# Patient Record
Sex: FEMALE | Race: WHITE | URBAN - METROPOLITAN AREA
[De-identification: names, ages, dates, MRNs, and addresses within clinical notes are randomized per-mention and may not be internally consistent; named-entity substitution may affect disease eponyms.]

---

## 2018-11-20 ENCOUNTER — APPOINTMENT (RX ONLY)
Dept: URBAN - METROPOLITAN AREA CLINIC 164 | Facility: CLINIC | Age: 68
Setting detail: DERMATOLOGY
End: 2018-11-20

## 2018-11-20 DIAGNOSIS — D22 MELANOCYTIC NEVI: ICD-10-CM

## 2018-11-20 DIAGNOSIS — D18.0 HEMANGIOMA: ICD-10-CM

## 2018-11-20 DIAGNOSIS — L82.0 INFLAMED SEBORRHEIC KERATOSIS: ICD-10-CM

## 2018-11-20 DIAGNOSIS — L73.8 OTHER SPECIFIED FOLLICULAR DISORDERS: ICD-10-CM

## 2018-11-20 DIAGNOSIS — L82.1 OTHER SEBORRHEIC KERATOSIS: ICD-10-CM

## 2018-11-20 DIAGNOSIS — L81.4 OTHER MELANIN HYPERPIGMENTATION: ICD-10-CM

## 2018-11-20 PROBLEM — L70.0 ACNE VULGARIS: Status: ACTIVE | Noted: 2018-11-20

## 2018-11-20 PROBLEM — L23.7 ALLERGIC CONTACT DERMATITIS DUE TO PLANTS, EXCEPT FOOD: Status: ACTIVE | Noted: 2018-11-20

## 2018-11-20 PROBLEM — J30.1 ALLERGIC RHINITIS DUE TO POLLEN: Status: ACTIVE | Noted: 2018-11-20

## 2018-11-20 PROBLEM — D18.01 HEMANGIOMA OF SKIN AND SUBCUTANEOUS TISSUE: Status: ACTIVE | Noted: 2018-11-20

## 2018-11-20 PROBLEM — L57.0 ACTINIC KERATOSIS: Status: ACTIVE | Noted: 2018-11-20

## 2018-11-20 PROBLEM — D22.5 MELANOCYTIC NEVI OF TRUNK: Status: ACTIVE | Noted: 2018-11-20

## 2018-11-20 PROBLEM — D89.9 DISORDER INVOLVING THE IMMUNE MECHANISM, UNSPECIFIED: Status: ACTIVE | Noted: 2018-11-20

## 2018-11-20 PROCEDURE — ? LIQUID NITROGEN

## 2018-11-20 PROCEDURE — ? COUNSELING

## 2018-11-20 PROCEDURE — 17110 DESTRUCTION B9 LES UP TO 14: CPT

## 2018-11-20 PROCEDURE — 99214 OFFICE O/P EST MOD 30 MIN: CPT | Mod: 25

## 2018-11-20 ASSESSMENT — LOCATION DETAILED DESCRIPTION DERM
LOCATION DETAILED: RIGHT SUPERIOR MEDIAL MIDBACK
LOCATION DETAILED: LEFT MID-UPPER BACK
LOCATION DETAILED: RIGHT SUPERIOR UPPER BACK
LOCATION DETAILED: RIGHT MID-UPPER BACK
LOCATION DETAILED: RIGHT SUPERIOR MEDIAL MALAR CHEEK
LOCATION DETAILED: LEFT SUPERIOR CENTRAL MALAR CHEEK
LOCATION DETAILED: RIGHT INFERIOR CENTRAL MALAR CHEEK
LOCATION DETAILED: LEFT SUPERIOR LATERAL MALAR CHEEK
LOCATION DETAILED: RIGHT CENTRAL MALAR CHEEK
LOCATION DETAILED: RIGHT MEDIAL MALAR CHEEK

## 2018-11-20 ASSESSMENT — LOCATION ZONE DERM
LOCATION ZONE: TRUNK
LOCATION ZONE: FACE

## 2018-11-20 ASSESSMENT — LOCATION SIMPLE DESCRIPTION DERM
LOCATION SIMPLE: RIGHT CHEEK
LOCATION SIMPLE: LEFT UPPER BACK
LOCATION SIMPLE: RIGHT UPPER BACK
LOCATION SIMPLE: RIGHT LOWER BACK
LOCATION SIMPLE: LEFT CHEEK

## 2019-11-04 ENCOUNTER — APPOINTMENT (RX ONLY)
Dept: URBAN - METROPOLITAN AREA CLINIC 164 | Facility: CLINIC | Age: 69
Setting detail: DERMATOLOGY
End: 2019-11-04

## 2019-11-04 DIAGNOSIS — D18.0 HEMANGIOMA: ICD-10-CM

## 2019-11-04 DIAGNOSIS — L57.8 OTHER SKIN CHANGES DUE TO CHRONIC EXPOSURE TO NONIONIZING RADIATION: ICD-10-CM

## 2019-11-04 DIAGNOSIS — L81.4 OTHER MELANIN HYPERPIGMENTATION: ICD-10-CM

## 2019-11-04 DIAGNOSIS — L82.1 OTHER SEBORRHEIC KERATOSIS: ICD-10-CM

## 2019-11-04 DIAGNOSIS — D22 MELANOCYTIC NEVI: ICD-10-CM

## 2019-11-04 PROBLEM — D22.5 MELANOCYTIC NEVI OF TRUNK: Status: ACTIVE | Noted: 2019-11-04

## 2019-11-04 PROBLEM — D18.01 HEMANGIOMA OF SKIN AND SUBCUTANEOUS TISSUE: Status: ACTIVE | Noted: 2019-11-04

## 2019-11-04 PROCEDURE — ? FULL BODY SKIN EXAM

## 2019-11-04 PROCEDURE — ? COUNSELING

## 2019-11-04 PROCEDURE — 99213 OFFICE O/P EST LOW 20 MIN: CPT

## 2019-11-04 PROCEDURE — ? SUNSCREEN RECOMMENDATIONS

## 2019-11-04 ASSESSMENT — LOCATION DETAILED DESCRIPTION DERM
LOCATION DETAILED: SUPERIOR LUMBAR SPINE
LOCATION DETAILED: RIGHT SUPERIOR MEDIAL MIDBACK

## 2019-11-04 ASSESSMENT — LOCATION SIMPLE DESCRIPTION DERM
LOCATION SIMPLE: RIGHT LOWER BACK
LOCATION SIMPLE: LOWER BACK

## 2019-11-04 ASSESSMENT — LOCATION ZONE DERM: LOCATION ZONE: TRUNK

## 2019-11-04 NOTE — PROCEDURE: MIPS QUALITY
Detail Level: Detailed
Quality 111:Pneumonia Vaccination Status For Older Adults: Pneumococcal Vaccination Previously Received
Quality 130: Documentation Of Current Medications In The Medical Record: Current Medications Documented
Quality 226: Preventive Care And Screening: Tobacco Use: Screening And Cessation Intervention: Patient screened for tobacco use and is an ex/non-smoker
Quality 47: Advance Care Plan: Advance Care Planning discussed and documented; advance care plan or surrogate decision maker documented in the medical record.
Quality 110: Preventive Care And Screening: Influenza Immunization: Influenza Immunization Administered during Influenza season
Quality 402: Tobacco Use And Help With Quitting Among Adolescents: Patient screened for tobacco and never smoked
Quality 474: Zoster Vaccination Status: Shingrix Vaccination not Administered or Previously Received, Reason not Otherwise Specified
Quality 431: Preventive Care And Screening: Unhealthy Alcohol Use - Screening: Patient screened for unhealthy alcohol use using a single question and scores less than 2 times per year

## 2020-08-03 LAB
HCT VFR BLD CALC: 18.8 % (ref 36–46)
HEMOGLOBIN: 6.4 G/DL (ref 12–16)

## 2020-08-04 LAB
APPEARANCE: ABNORMAL
BACTERIA, URINE: ABNORMAL /HPF
BILIRUBIN, URINE: NEGATIVE
BLOOD, URINE: NEGATIVE
COLOR, URINE: YELLOW
GLUCOSE, URINE: NEGATIVE MG/DL
KETONES, URINE: NEGATIVE MG/DL
LEUKOCYTE ESTERASE, URINE: ABNORMAL
NITRITE, URINE: NEGATIVE
PH UA: 5 (ref 5–8)
PROTEIN UA: NEGATIVE MG/DL
RBC URINE: 27 /HPF (ref 0–5)
SPECIFIC GRAVITY, URINE: 1.01 (ref 1–1)
SPECIMEN SOURCE: ABNORMAL
SPECIMEN SOURCE: ABNORMAL
SURGICAL PATHOLOGY REPORT: NORMAL
UROBILINOGEN, URINE: <2 MG/DL (ref 0–1.9)
WBC URINE: >182 /HPF (ref 0–5)

## 2020-08-17 ENCOUNTER — OFFICE VISIT (OUTPATIENT)
Dept: GERIATRIC MEDICINE | Age: 70
End: 2020-08-17
Payer: MEDICARE

## 2020-08-17 VITALS
RESPIRATION RATE: 20 BRPM | DIASTOLIC BLOOD PRESSURE: 78 MMHG | TEMPERATURE: 98.3 F | SYSTOLIC BLOOD PRESSURE: 136 MMHG | HEART RATE: 74 BPM | OXYGEN SATURATION: 96 %

## 2020-08-17 PROBLEM — K26.4 GASTROINTESTINAL HEMORRHAGE ASSOCIATED WITH DUODENAL ULCER: Status: ACTIVE | Noted: 2020-08-17

## 2020-08-17 PROBLEM — N18.30 CKD (CHRONIC KIDNEY DISEASE) STAGE 3, GFR 30-59 ML/MIN (HCC): Status: ACTIVE | Noted: 2020-08-17

## 2020-08-17 PROBLEM — I10 ESSENTIAL HYPERTENSION: Status: ACTIVE | Noted: 2020-08-17

## 2020-08-17 PROBLEM — J44.9 CHRONIC OBSTRUCTIVE PULMONARY DISEASE (HCC): Status: ACTIVE | Noted: 2020-08-17

## 2020-08-17 PROBLEM — D62 ANEMIA DUE TO ACUTE BLOOD LOSS: Status: ACTIVE | Noted: 2020-08-17

## 2020-08-17 PROCEDURE — 99305 1ST NF CARE MODERATE MDM 35: CPT | Performed by: FAMILY MEDICINE

## 2020-08-17 PROCEDURE — 1123F ACP DISCUSS/DSCN MKR DOCD: CPT | Performed by: FAMILY MEDICINE

## 2020-08-17 RX ORDER — DIPHENHYDRAMINE HYDROCHLORIDE 25 MG/1
25 CAPSULE ORAL EVERY 8 HOURS PRN
COMMUNITY
Start: 2020-06-25 | End: 2020-09-16

## 2020-08-17 RX ORDER — METOPROLOL TARTRATE 50 MG/1
50 TABLET, FILM COATED ORAL 2 TIMES DAILY
COMMUNITY
End: 2020-09-16 | Stop reason: SDUPTHER

## 2020-08-17 RX ORDER — OMEPRAZOLE 40 MG/1
40 CAPSULE, DELAYED RELEASE ORAL DAILY
COMMUNITY
End: 2020-09-16 | Stop reason: SDUPTHER

## 2020-08-17 RX ORDER — ALBUTEROL SULFATE 0.63 MG/3ML
1 SOLUTION RESPIRATORY (INHALATION) EVERY 4 HOURS
COMMUNITY
Start: 2020-07-24 | End: 2020-09-16

## 2020-08-17 RX ORDER — AMLODIPINE BESYLATE 5 MG/1
10 TABLET ORAL EVERY MORNING
COMMUNITY
Start: 2020-06-25 | End: 2020-09-16

## 2020-08-17 ASSESSMENT — ENCOUNTER SYMPTOMS
WHEEZING: 0
CONSTIPATION: 0
DIARRHEA: 0
COUGH: 0
SHORTNESS OF BREATH: 0
RHINORRHEA: 0
TROUBLE SWALLOWING: 1
SORE THROAT: 1
ABDOMINAL PAIN: 0

## 2020-08-17 NOTE — PROGRESS NOTES
[facility]   [address]  [address]  P: [phone]   O: [phone]        Chief Complaint  Chief Complaint   Patient presents with    GI Bleeding       HPI:  65 y. o.female w hx of HTN, COPD, CKD3, pulm nodule. She initially presented to the [address] [place] [address] ED with destats and SOB. This was attributed to new systolic HF and COPD exacerbation. She developed dark stools and was found to have a Hb 4.1. An EGD revealed a bleeding duodenal ulcer followed by clipping. The EGD and clipping was repeated unsuccessfully 3 times as she was transfused a total of 11u RBCs. She was transferred to [address] [place] [address] where she had IR embolization. The CKD worsened and she was started on dialysis. She was treated for pneumonia. She was discharged to SNF for continued care, dialysis, and plans to f/u with GI as outpatient. We met at dinner time in her room. She is feeling much better and denies SOB, abd pain, n/v, or blood in the stool. Her throat is quite sore from the procedures so she prefers to only have fluids which she is tolerating well. History reviewed. No pertinent past medical history. History reviewed. No pertinent surgical history. Social History     Socioeconomic History    Marital status:       Spouse name: Not on file    Number of children: Not on file    Years of education: Not on file    Highest education level: Not on file   Occupational History    Not on file   Social Needs    Financial resource strain: Not on file    Food insecurity     Worry: Not on file     Inability: Not on file    Transportation needs     Medical: Not on file     Non-medical: Not on file   Tobacco Use    Smoking status: Not on file   Substance and Sexual Activity    Alcohol use: Not on file    Drug use: Not on file    Sexual activity: Not on file   Lifestyle    Physical activity     Days per week: Not on file     Minutes per session: Not on file    Stress: Not on file   Relationships    Social connections     Talks on phone: Not on file     Gets together: Not on file     Attends Anglican service: Not on file     Active member of club or organization: Not on file     Attends meetings of clubs or organizations: Not on file     Relationship status: Not on file    Intimate partner violence     Fear of current or ex partner: Not on file     Emotionally abused: Not on file     Physically abused: Not on file     Forced sexual activity: Not on file   Other Topics Concern    Not on file   Social History Narrative    Not on file     Allergies   Allergen Reactions    Ace Inhibitors     Benazepril     Ciprofloxacin     Shellfish Allergy     Sulfa Antibiotics      Current Outpatient Medications   Medication Sig Dispense Refill    albuterol (ACCUNEB) 0.63 MG/3ML nebulizer solution Inhale 1 Units into the lungs every 4 hours      amLODIPine (NORVASC) 5 MG tablet Take 10 mg by mouth every morning      BANOPHEN 25 MG capsule Take 25 mg by mouth every 8 hours as needed      metoprolol tartrate (LOPRESSOR) 50 MG tablet Take 50 mg by mouth 2 times daily      omeprazole (PRILOSEC) 40 MG delayed release capsule Take 40 mg by mouth daily       No current facility-administered medications for this visit. ROS:  Review of Systems   Constitutional: Negative for chills and fever. HENT: Positive for sore throat and trouble swallowing. Negative for rhinorrhea. Respiratory: Negative for cough, shortness of breath and wheezing. Gastrointestinal: Negative for abdominal pain, constipation and diarrhea. Endocrine: Negative for polydipsia and polyuria. Genitourinary: Negative for dysuria, frequency and urgency. Neurological: Negative for syncope, light-headedness, numbness and headaches. Psychiatric/Behavioral: Negative for sleep disturbance. The patient is not nervous/anxious.         Vitals:    08/17/20 1129   BP: 136/78   Pulse: 74   Resp: 20   Temp: 98.3 °F (36.8 °C)   SpO2: 96%       Physical exam:  Physical Exam  Vitals signs reviewed. Constitutional:       General: She is not in acute distress. Appearance: She is well-developed. HENT:      Head: Normocephalic and atraumatic. Mouth/Throat:      Pharynx: No oropharyngeal exudate. Neck:      Musculoskeletal: Normal range of motion. Thyroid: No thyromegaly. Cardiovascular:      Rate and Rhythm: Normal rate and regular rhythm. Heart sounds: Normal heart sounds. No murmur. Pulmonary:      Effort: Pulmonary effort is normal. No respiratory distress. Breath sounds: Normal breath sounds. No wheezing. Abdominal:      General: There is no distension. Palpations: Abdomen is soft. Tenderness: There is no abdominal tenderness. There is no guarding or rebound. Lymphadenopathy:      Cervical: No cervical adenopathy. Skin:     General: Skin is warm and dry. Neurological:      Mental Status: She is alert and oriented to person, place, and time. Psychiatric:         Behavior: Behavior normal.         Assessment/Plan:  65 y.o. female here mainly for GI bleed:  - encouraged PO as tolerated; she is aware to let someone know if develops blood in the stool; She'll f/u GI as outpatient  - CKD: continuing the HD per renal recs     Diagnosis Orders   1. Gastrointestinal hemorrhage associated with duodenal ulcer     2. Anemia due to acute blood loss     3. Chronic obstructive pulmonary disease, unspecified COPD type ([facility] [address]     4. CKD (chronic kidney disease) stage 3, GFR 30-59 ml/min ([facility])     5.  Essential hypertension              Dyan Carson MD

## 2020-08-31 ENCOUNTER — OFFICE VISIT (OUTPATIENT)
Dept: GERIATRIC MEDICINE | Age: 70
End: 2020-08-31
Payer: MEDICARE

## 2020-08-31 PROCEDURE — 99308 SBSQ NF CARE LOW MDM 20: CPT | Performed by: PHYSICIAN ASSISTANT

## 2020-08-31 PROCEDURE — 1111F DSCHRG MED/CURRENT MED MERGE: CPT | Performed by: PHYSICIAN ASSISTANT

## 2020-09-10 NOTE — PROGRESS NOTES
PATIENT: Crystal Reddy : 1950 DOS: 2020     [facility]    HISTORY OF PRESENT ILLNESS:  Vital signs reviewed on site today, stable, within normal limits. Patient being seen today after she had been admitted to the hospital for shock and bleeding ulcers. She states did have respiratory distress and was intubated, she states. Had multiple esophageal scopes with interventions as well. We will evaluate patient's chart, the [facility], to confirm. Patient states that she felt like she was going to die at that time; however, she feels much better. She feels \"incredible in comparison. \"  She is ready to discharge. She has been refusing aerosols as well as protein supplementation. Will DC both of these medications, as she has not used or has been in need out. She is eating adequately well day-to-day. She is on a renal diet that she states it is hard to tolerate, but she does her best to get good nutrition in. She lives at home and near her family. Her son is 5 minutes away from her home as well as other family including her granddaughter. The patient is making gains in physical therapy, increasing strength and ability to walk and ambulate via walker. Will begin to prepare this patient for discharge, planning to go home. Will do a CBC and a CMP to evaluate prior to discharge. We will revisit patient in 1 week, when patient will likely have discharge prepared. Patient denies any new bleeding. No hemoptysis, hematuria, BRBPR. Denies any dark stool. Does not report any dizziness or focal neurologic deficit. Cranial nerves II through XII intact. Heart rate and rhythm normal.  States no headaches. No GI or  upset at this time. She is continent. A and O x3 today. Very pleasant, cooperative overall. Will follow up and begin discharge planning as discussed prior. MEDICATIONS:  Reviewed. ALLERGIES:  Reviewed. REVIEW OF SYSTEMS:  See HPI.     PHYSICAL EXAMINATION:  GENERAL:   Bright, alert, good hygiene, oriented x3. CARDIOPULMONARY:  Regular rate, rhythm. No JVD noted. No significant lower leg edema. Pedal pulses intact, +1 bilaterally. PULMONARY:  Lung sounds clear to auscultation. ABDOMINAL:  Normal bowel sounds. Soft, nontender in all 4 quadrants. Some mild distention due to obesity. PSYCHIATRIC:  The patient's mood is overall stable. Denies any stress, anxiety. Some minor dysthymia due to wanted to be home and with family. ASSESSMENT AND PLAN:  1. History of gastric ulcers - no new bleeding. Continue current medications. Follow up with PCP upon discharge. 2.   COPD / History of respiratory distress  -patient has currently no respiratory issues at the moment. DC aerosols, due to unused. 3.   Protein malnutrition  - DC protein supplements due to unused. Patient is getting adequate diet p.o.         Electronically Signed By: Mitch Sidhu PA-C on 08/31/2020 20:36:07  ______________________________  MOON Cope/[id number]  D: 08/31/2020 15:46:03  T: 08/31/2020 16:49:18    cc: Ridge Garcia

## 2020-09-16 ENCOUNTER — OFFICE VISIT (OUTPATIENT)
Dept: FAMILY MEDICINE CLINIC | Age: 70
End: 2020-09-16
Payer: MEDICARE

## 2020-09-16 VITALS
BODY MASS INDEX: 26.4 KG/M2 | HEART RATE: 63 BPM | DIASTOLIC BLOOD PRESSURE: 78 MMHG | HEIGHT: 63 IN | SYSTOLIC BLOOD PRESSURE: 110 MMHG | OXYGEN SATURATION: 98 % | WEIGHT: 149 LBS | TEMPERATURE: 97.5 F

## 2020-09-16 PROCEDURE — 1090F PRES/ABSN URINE INCON ASSESS: CPT | Performed by: FAMILY MEDICINE

## 2020-09-16 PROCEDURE — G8427 DOCREV CUR MEDS BY ELIG CLIN: HCPCS | Performed by: FAMILY MEDICINE

## 2020-09-16 PROCEDURE — G9901 SNP/LG TRM CRE PT W/POS CDE: HCPCS | Performed by: FAMILY MEDICINE

## 2020-09-16 PROCEDURE — G9898 SNP/LG TRM CRE PT W/POS CDE: HCPCS | Performed by: FAMILY MEDICINE

## 2020-09-16 PROCEDURE — 1123F ACP DISCUSS/DSCN MKR DOCD: CPT | Performed by: FAMILY MEDICINE

## 2020-09-16 PROCEDURE — G8417 CALC BMI ABV UP PARAM F/U: HCPCS | Performed by: FAMILY MEDICINE

## 2020-09-16 PROCEDURE — 99204 OFFICE O/P NEW MOD 45 MIN: CPT | Performed by: FAMILY MEDICINE

## 2020-09-16 PROCEDURE — G8400 PT W/DXA NO RESULTS DOC: HCPCS | Performed by: FAMILY MEDICINE

## 2020-09-16 PROCEDURE — 4040F PNEUMOC VAC/ADMIN/RCVD: CPT | Performed by: FAMILY MEDICINE

## 2020-09-16 PROCEDURE — 1036F TOBACCO NON-USER: CPT | Performed by: FAMILY MEDICINE

## 2020-09-16 RX ORDER — TORSEMIDE 100 MG/1
100 TABLET ORAL DAILY
Qty: 90 TABLET | Refills: 1 | Status: SHIPPED | OUTPATIENT
Start: 2020-09-16 | End: 2021-01-11 | Stop reason: ALTCHOICE

## 2020-09-16 RX ORDER — OMEPRAZOLE 40 MG/1
20 CAPSULE, DELAYED RELEASE ORAL 2 TIMES DAILY
Qty: 60 CAPSULE | Refills: 5 | Status: SHIPPED | OUTPATIENT
Start: 2020-09-16 | End: 2021-01-11 | Stop reason: ALTCHOICE

## 2020-09-16 RX ORDER — CHOLECALCIFEROL (VITAMIN D3) 10 MCG
1 TABLET ORAL DAILY
COMMUNITY
End: 2020-09-16 | Stop reason: SDUPTHER

## 2020-09-16 RX ORDER — CHOLECALCIFEROL (VITAMIN D3) 10 MCG
1 TABLET ORAL DAILY
Qty: 90 CAPSULE | Refills: 1 | Status: SHIPPED | OUTPATIENT
Start: 2020-09-16 | End: 2021-01-11 | Stop reason: ALTCHOICE

## 2020-09-16 RX ORDER — TORSEMIDE 100 MG/1
100 TABLET ORAL DAILY
Qty: 30 TABLET | Refills: 5 | Status: SHIPPED | OUTPATIENT
Start: 2020-09-16 | End: 2021-01-11 | Stop reason: ALTCHOICE

## 2020-09-16 RX ORDER — CHOLECALCIFEROL (VITAMIN D3) 10 MCG
1 TABLET ORAL DAILY
Qty: 30 CAPSULE | Refills: 5 | Status: SHIPPED | OUTPATIENT
Start: 2020-09-16 | End: 2021-01-11 | Stop reason: ALTCHOICE

## 2020-09-16 RX ORDER — OMEPRAZOLE 40 MG/1
40 CAPSULE, DELAYED RELEASE ORAL DAILY
Qty: 90 CAPSULE | Refills: 1 | Status: SHIPPED | OUTPATIENT
Start: 2020-09-16 | End: 2021-01-11 | Stop reason: ALTCHOICE

## 2020-09-16 RX ORDER — METOPROLOL TARTRATE 50 MG/1
50 TABLET, FILM COATED ORAL 2 TIMES DAILY
Qty: 180 TABLET | Refills: 1 | Status: SHIPPED | OUTPATIENT
Start: 2020-09-16 | End: 2021-01-11 | Stop reason: ALTCHOICE

## 2020-09-16 RX ORDER — OMEPRAZOLE 40 MG/1
40 CAPSULE, DELAYED RELEASE ORAL
Qty: 90 CAPSULE | Refills: 3 | Status: SHIPPED | OUTPATIENT
Start: 2020-09-16 | End: 2021-01-11 | Stop reason: ALTCHOICE

## 2020-09-16 RX ORDER — METOPROLOL TARTRATE 50 MG/1
50 TABLET, FILM COATED ORAL 2 TIMES DAILY
Qty: 60 TABLET | Refills: 5 | Status: SHIPPED | OUTPATIENT
Start: 2020-09-16 | End: 2021-01-11 | Stop reason: ALTCHOICE

## 2020-09-16 RX ORDER — TORSEMIDE 100 MG/1
100 TABLET ORAL DAILY
COMMUNITY
End: 2020-09-16 | Stop reason: SDUPTHER

## 2020-09-16 SDOH — HEALTH STABILITY: MENTAL HEALTH: HOW OFTEN DO YOU HAVE A DRINK CONTAINING ALCOHOL?: NEVER

## 2020-09-16 ASSESSMENT — PATIENT HEALTH QUESTIONNAIRE - PHQ9
SUM OF ALL RESPONSES TO PHQ QUESTIONS 1-9: 0
2. FEELING DOWN, DEPRESSED OR HOPELESS: 0
SUM OF ALL RESPONSES TO PHQ9 QUESTIONS 1 & 2: 0
1. LITTLE INTEREST OR PLEASURE IN DOING THINGS: 0
SUM OF ALL RESPONSES TO PHQ QUESTIONS 1-9: 0

## 2020-09-16 ASSESSMENT — ENCOUNTER SYMPTOMS
RHINORRHEA: 0
CONSTIPATION: 0
DIARRHEA: 0
COUGH: 0
ABDOMINAL PAIN: 0
WHEEZING: 0
SORE THROAT: 0
SHORTNESS OF BREATH: 0

## 2020-09-16 NOTE — PROGRESS NOTES
[address] [facility] [address] [place] [address] PRIMARY CARE  [id number]  Dept: [phone]  Dept Fax: : [phone]   Chief Complaint  Chief Complaint   Patient presents with   [facility]     follow up from [facility]       HPI:  65 y. o.female who presents for establish:    Lost her sense of taste for the past few months; has hunger but nothing tastes good. Still has has sense of smell and sense of tasting sweet. She is now home. Uses walker but getting PT. Lives alone. Her son visits 5min away. Sees renal for dialysis. Recent hospitalization and SNF admission:  65 y. o.female w hx of HTN, COPD, CKD3, pulm nodule. She initially presented to the [facility] ED with destats and SOB. This was attributed to new systolic HF and COPD exacerbation. She developed dark stools and was found to have a Hb 4.1. An EGD revealed a bleeding duodenal ulcer followed by clipping. The EGD and clipping was repeated unsuccessfully 3 times as she was transfused a total of 11u RBCs. She was transferred to [address] [place] [address] where she had IR embolization. The CKD worsened and she was started on dialysis. She was treated for pneumonia. She was discharged to SNF for continued care, dialysis, and plans to f/u with GI as outpatient. History reviewed. No pertinent past medical history. History reviewed. No pertinent surgical history. Social History     Socioeconomic History    Marital status:       Spouse name: Not on file    Number of children: Not on file    Years of education: Not on file    Highest education level: Not on file   Occupational History    Not on file   Social Needs    Financial resource strain: Not on file    Food insecurity     Worry: Not on file     Inability: Not on file    Transportation needs     Medical: Not on file     Non-medical: Not on file   Tobacco Use    Smoking status: Never Smoker    Smokeless tobacco: Never Used   Substance and Sexual Activity    Alcohol use: Never     Frequency: Never    Drug use: Never    Sexual activity: Not on file   Lifestyle    Physical activity     Days per week: Not on file     Minutes per session: Not on file    Stress: Not on file   Relationships    Social connections     Talks on phone: Not on file     Gets together: Not on file     Attends Holiness service: Not on file     Active member of club or organization: Not on file     Attends meetings of clubs or organizations: Not on file     Relationship status: Not on file    Intimate partner violence     Fear of current or ex partner: Not on file     Emotionally abused: Not on file     Physically abused: Not on file     Forced sexual activity: Not on file   Other Topics Concern    Not on file   Social History Narrative    Not on file     Allergies   Allergen Reactions    Ace Inhibitors     Benazepril     Ciprofloxacin     Shellfish Allergy     Sulfa Antibiotics      Current Outpatient Medications   Medication Sig Dispense Refill    omeprazole (PRILOSEC) 40 MG delayed release capsule Take 1 capsule by mouth 2 times daily 60 capsule 5    metoprolol tartrate (LOPRESSOR) 50 MG tablet Take 1 tablet by mouth 2 times daily 60 tablet 5    torsemide (DEMADEX) 100 MG tablet Take 1 tablet by mouth daily 30 tablet 5    b complex-C-folic acid (NEPHROCAPS) 1 MG capsule Take 1 capsule by mouth daily 30 capsule 5    omeprazole (PRILOSEC) 40 MG delayed release capsule Take 1 capsule by mouth every morning (before breakfast) 90 capsule 3    metoprolol tartrate (LOPRESSOR) 50 MG tablet Take 1 tablet by mouth 2 times daily 180 tablet 1    torsemide (DEMADEX) 100 MG tablet Take 1 tablet by mouth daily 90 tablet 1     No current facility-administered medications for this visit. ROS:  Review of Systems   Constitutional: Negative for chills and fever. HENT: Negative for rhinorrhea and sore throat.     Respiratory: Negative for cough, shortness of breath and wheezing. Gastrointestinal: Negative for abdominal pain, constipation and diarrhea. Endocrine: Negative for polydipsia and polyuria. Genitourinary: Negative for dysuria, frequency and urgency. Musculoskeletal: Positive for gait problem. Neurological: Negative for syncope, light-headedness, numbness and headaches. Psychiatric/Behavioral: Negative for sleep disturbance. The patient is not nervous/anxious. Vitals:    09/16/20 1359   BP: 110/78   Site: Right Upper Arm   Position: Sitting   Cuff Size: Medium Adult   Pulse: 63   Temp: 97.5 °F (36.4 °C)   TempSrc: Infrared   SpO2: 98%   Weight: 149 lb (67.6 kg)   Height: 5' 3\" (1.6 m)       Physical exam:  Physical Exam  Vitals signs reviewed. Constitutional:       General: She is not in acute distress. Appearance: She is well-developed. HENT:      Head: Normocephalic and atraumatic. Mouth/Throat:      Pharynx: No oropharyngeal exudate. Neck:      Musculoskeletal: Normal range of motion. Thyroid: No thyromegaly. Cardiovascular:      Rate and Rhythm: Normal rate and regular rhythm. Heart sounds: Normal heart sounds. No murmur. Pulmonary:      Effort: Pulmonary effort is normal. No respiratory distress. Breath sounds: Normal breath sounds. No wheezing. Abdominal:      General: There is no distension. Palpations: Abdomen is soft. Tenderness: There is no abdominal tenderness. There is no guarding or rebound. Lymphadenopathy:      Cervical: No cervical adenopathy. Skin:     General: Skin is warm and dry. Neurological:      Mental Status: She is alert and oriented to person, place, and time. Psychiatric:         Behavior: Behavior normal.         Assessment/Plan:  65 y.o. female here mainly for establish and multiple issues:  - meds refilled; Renal will handle her diuretics; sending to neuro regarding the loss of taste     Diagnosis Orders   1.  CKD (chronic kidney disease) stage 3, GFR 30-59 ml/min ([facility])  torsemide (DEMADEX) 100 MG tablet    torsemide (DEMADEX) 100 MG tablet   2. Essential hypertension  metoprolol tartrate (LOPRESSOR) 50 MG tablet    metoprolol tartrate (LOPRESSOR) 50 MG tablet   3. Encounter for medical examination to establish care     4. Gastroesophageal reflux disease, esophagitis presence not specified  omeprazole (PRILOSEC) 40 MG delayed release capsule    b complex-C-folic acid (NEPHROCAPS) 1 MG capsule    omeprazole (PRILOSEC) 40 MG delayed release capsule   5. Loss of perception for taste  Ambulatory referral to Neurology        Return if symptoms worsen or fail to improve.     Faith Fu MD

## 2020-09-16 NOTE — TELEPHONE ENCOUNTER
Rx requested:  Requested Prescriptions     Pending Prescriptions Disp Refills    b complex-C-folic acid (NEPHROCAPS) 1 MG capsule 90 capsule 1     Sig: Take 1 capsule by mouth daily    metoprolol tartrate (LOPRESSOR) 50 MG tablet 180 tablet 1     Sig: Take 1 tablet by mouth 2 times daily    omeprazole (PRILOSEC) 40 MG delayed release capsule 90 capsule 1     Sig: Take 1 capsule by mouth daily    torsemide (DEMADEX) 100 MG tablet 90 tablet 1     Sig: Take 1 tablet by mouth daily       Last Office Visit:   Seen today by Dr Italo Mayberry- maisha 30 days at local and 90 days at mail a way  Last filled:  9/16/2020    Next Visit Date:  No future appointments.

## 2020-12-14 ENCOUNTER — APPOINTMENT (RX ONLY)
Dept: URBAN - METROPOLITAN AREA CLINIC 164 | Facility: CLINIC | Age: 70
Setting detail: DERMATOLOGY
End: 2020-12-14

## 2020-12-14 DIAGNOSIS — L81.4 OTHER MELANIN HYPERPIGMENTATION: ICD-10-CM

## 2020-12-14 DIAGNOSIS — D18.0 HEMANGIOMA: ICD-10-CM

## 2020-12-14 DIAGNOSIS — L82.1 OTHER SEBORRHEIC KERATOSIS: ICD-10-CM

## 2020-12-14 DIAGNOSIS — D22 MELANOCYTIC NEVI: ICD-10-CM

## 2020-12-14 DIAGNOSIS — L57.8 OTHER SKIN CHANGES DUE TO CHRONIC EXPOSURE TO NONIONIZING RADIATION: ICD-10-CM

## 2020-12-14 PROBLEM — D22.5 MELANOCYTIC NEVI OF TRUNK: Status: ACTIVE | Noted: 2020-12-14

## 2020-12-14 PROBLEM — D18.01 HEMANGIOMA OF SKIN AND SUBCUTANEOUS TISSUE: Status: ACTIVE | Noted: 2020-12-14

## 2020-12-14 PROCEDURE — ? FULL BODY SKIN EXAM

## 2020-12-14 PROCEDURE — ? COUNSELING

## 2020-12-14 PROCEDURE — 99214 OFFICE O/P EST MOD 30 MIN: CPT

## 2020-12-14 PROCEDURE — ? SUNSCREEN RECOMMENDATIONS

## 2020-12-14 ASSESSMENT — LOCATION DETAILED DESCRIPTION DERM
LOCATION DETAILED: SUPERIOR LUMBAR SPINE
LOCATION DETAILED: RIGHT SUPERIOR MEDIAL MIDBACK

## 2020-12-14 ASSESSMENT — LOCATION ZONE DERM: LOCATION ZONE: TRUNK

## 2020-12-14 ASSESSMENT — LOCATION SIMPLE DESCRIPTION DERM
LOCATION SIMPLE: RIGHT LOWER BACK
LOCATION SIMPLE: LOWER BACK

## 2020-12-14 NOTE — PROCEDURE: SUNSCREEN RECOMMENDATIONS
Reason for Call:  Other prescription    Detailed comments: mom calling, asking for a refill on Sam's FLUoxetine (PROZAC) 20 MG/5ML solution sent to [facility]     Phone Number Patient can be reached at: Home number on file [phone] (home)    Best Time: any     Can we leave a detailed message on this number? YES    Call taken on 5/25/2018 at 10:28 AM by Billie Anaya        
General Sunscreen Counseling: I recommended a broad spectrum sunscreen with a SPF of 30 or higher.  I explained that SPF 30 sunscreens block approximately 97 percent of the sun's harmful rays.  Sunscreens should be applied at least 15 minutes prior to expected sun exposure and then every 2 hours after that as long as sun exposure continues. If swimming or exercising sunscreen should be reapplied every 45 minutes to an hour after getting wet or sweating.  One ounce, or the equivalent of a shot glass full of sunscreen, is adequate to protect the skin not covered by a bathing suit. I also recommended a lip balm with a sunscreen as well. Sun protective clothing can be used in lieu of sunscreen but must be worn the entire time you are exposed to the sun's rays.
Detail Level: Generalized

## 2021-01-11 ENCOUNTER — OFFICE VISIT (OUTPATIENT)
Dept: FAMILY MEDICINE CLINIC | Age: 71
End: 2021-01-11
Payer: MEDICARE

## 2021-01-11 VITALS
DIASTOLIC BLOOD PRESSURE: 64 MMHG | HEART RATE: 81 BPM | SYSTOLIC BLOOD PRESSURE: 128 MMHG | RESPIRATION RATE: 14 BRPM | HEIGHT: 61 IN | TEMPERATURE: 97.9 F | OXYGEN SATURATION: 98 % | WEIGHT: 135 LBS | BODY MASS INDEX: 25.49 KG/M2

## 2021-01-11 DIAGNOSIS — Z12.11 SCREENING FOR COLON CANCER: ICD-10-CM

## 2021-01-11 DIAGNOSIS — D62 ANEMIA DUE TO ACUTE BLOOD LOSS: ICD-10-CM

## 2021-01-11 DIAGNOSIS — N18.30 STAGE 3 CHRONIC KIDNEY DISEASE, UNSPECIFIED WHETHER STAGE 3A OR 3B CKD (HCC): ICD-10-CM

## 2021-01-11 DIAGNOSIS — J44.9 CHRONIC OBSTRUCTIVE PULMONARY DISEASE, UNSPECIFIED COPD TYPE (HCC): ICD-10-CM

## 2021-01-11 DIAGNOSIS — K26.4 GASTROINTESTINAL HEMORRHAGE ASSOCIATED WITH DUODENAL ULCER: ICD-10-CM

## 2021-01-11 DIAGNOSIS — I10 ESSENTIAL HYPERTENSION: ICD-10-CM

## 2021-01-11 DIAGNOSIS — Z12.31 ENCOUNTER FOR SCREENING MAMMOGRAM FOR BREAST CANCER: Primary | ICD-10-CM

## 2021-01-11 PROCEDURE — 3017F COLORECTAL CA SCREEN DOC REV: CPT | Performed by: FAMILY MEDICINE

## 2021-01-11 PROCEDURE — 99214 OFFICE O/P EST MOD 30 MIN: CPT | Performed by: FAMILY MEDICINE

## 2021-01-11 PROCEDURE — 1090F PRES/ABSN URINE INCON ASSESS: CPT | Performed by: FAMILY MEDICINE

## 2021-01-11 PROCEDURE — 3023F SPIROM DOC REV: CPT | Performed by: FAMILY MEDICINE

## 2021-01-11 PROCEDURE — G8926 SPIRO NO PERF OR DOC: HCPCS | Performed by: FAMILY MEDICINE

## 2021-01-11 PROCEDURE — G8484 FLU IMMUNIZE NO ADMIN: HCPCS | Performed by: FAMILY MEDICINE

## 2021-01-11 PROCEDURE — 4040F PNEUMOC VAC/ADMIN/RCVD: CPT | Performed by: FAMILY MEDICINE

## 2021-01-11 PROCEDURE — 1123F ACP DISCUSS/DSCN MKR DOCD: CPT | Performed by: FAMILY MEDICINE

## 2021-01-11 PROCEDURE — G8427 DOCREV CUR MEDS BY ELIG CLIN: HCPCS | Performed by: FAMILY MEDICINE

## 2021-01-11 PROCEDURE — 1036F TOBACCO NON-USER: CPT | Performed by: FAMILY MEDICINE

## 2021-01-11 PROCEDURE — G8400 PT W/DXA NO RESULTS DOC: HCPCS | Performed by: FAMILY MEDICINE

## 2021-01-11 PROCEDURE — G8417 CALC BMI ABV UP PARAM F/U: HCPCS | Performed by: FAMILY MEDICINE

## 2021-01-11 RX ORDER — B,C/FOLIC/ZINC/SELENOMETH/D3/E 0.8-12.5MG
TABLET ORAL
COMMUNITY
Start: 2020-12-21

## 2021-01-11 RX ORDER — OMEPRAZOLE 20 MG/1
20 CAPSULE, DELAYED RELEASE ORAL DAILY
COMMUNITY
End: 2021-01-15 | Stop reason: SDUPTHER

## 2021-01-11 SDOH — ECONOMIC STABILITY: INCOME INSECURITY: HOW HARD IS IT FOR YOU TO PAY FOR THE VERY BASICS LIKE FOOD, HOUSING, MEDICAL CARE, AND HEATING?: NOT HARD AT ALL

## 2021-01-11 SDOH — ECONOMIC STABILITY: FOOD INSECURITY: WITHIN THE PAST 12 MONTHS, THE FOOD YOU BOUGHT JUST DIDN'T LAST AND YOU DIDN'T HAVE MONEY TO GET MORE.: NEVER TRUE

## 2021-01-11 SDOH — ECONOMIC STABILITY: TRANSPORTATION INSECURITY
IN THE PAST 12 MONTHS, HAS THE LACK OF TRANSPORTATION KEPT YOU FROM MEDICAL APPOINTMENTS OR FROM GETTING MEDICATIONS?: NO

## 2021-01-11 SDOH — ECONOMIC STABILITY: FOOD INSECURITY: WITHIN THE PAST 12 MONTHS, YOU WORRIED THAT YOUR FOOD WOULD RUN OUT BEFORE YOU GOT MONEY TO BUY MORE.: NEVER TRUE

## 2021-01-11 SDOH — ECONOMIC STABILITY: TRANSPORTATION INSECURITY
IN THE PAST 12 MONTHS, HAS LACK OF TRANSPORTATION KEPT YOU FROM MEETINGS, WORK, OR FROM GETTING THINGS NEEDED FOR DAILY LIVING?: NO

## 2021-01-11 ASSESSMENT — ENCOUNTER SYMPTOMS
ABDOMINAL PAIN: 0
COUGH: 0
CONSTIPATION: 0
SHORTNESS OF BREATH: 0
APNEA: 0
NAUSEA: 0
DIARRHEA: 0
VOMITING: 0
BLOOD IN STOOL: 0
CHEST TIGHTNESS: 0

## 2021-01-11 ASSESSMENT — PATIENT HEALTH QUESTIONNAIRE - PHQ9
SUM OF ALL RESPONSES TO PHQ QUESTIONS 1-9: 0
SUM OF ALL RESPONSES TO PHQ9 QUESTIONS 1 & 2: 0
SUM OF ALL RESPONSES TO PHQ QUESTIONS 1-9: 0

## 2021-01-11 NOTE — PROGRESS NOTES
Subjective:      Patient ID: Jessi Pablo is a 65 y.o. female who presents today for:     Chief Complaint   Patient presents with   [facility] Establish Care     patient is here following up from the hospital on low BP. HPI  Patient is a 64-year-old female with a history of end-stage renal disease on dialysis as well as COPD who presents today to establish care. She states that she overall is doing well and is joined today by her son. Denies any chest discomfort, shortness of breath or lower extremity edema. She states that she does get tired during days of dialysis but otherwise feels well  We will need to request full records from [facility]  History reviewed. No pertinent past medical history. History reviewed. No pertinent surgical history. History reviewed. No pertinent family history. Social History     Socioeconomic History    Marital status:       Spouse name: Not on file    Number of children: Not on file    Years of education: Not on file    Highest education level: Not on file   Occupational History    Not on file   Social Needs    Financial resource strain: Not hard at all    Food insecurity     Worry: Never true     Inability: Never true   [employer] needs     Medical: No     Non-medical: No   Tobacco Use    Smoking status: Never Smoker    Smokeless tobacco: Never Used   Substance and Sexual Activity    Alcohol use: Never     Frequency: Never    Drug use: Never    Sexual activity: Not on file   Lifestyle    Physical activity     Days per week: Not on file     Minutes per session: Not on file    Stress: Not on file   Relationships    Social connections     Talks on phone: Not on file     Gets together: Not on file     Attends Jain service: Not on file     Active member of club or organization: Not on file     Attends meetings of clubs or organizations: Not on file     Relationship status: Not on file    Intimate partner violence Fear of current or ex partner: Not on file     Emotionally abused: Not on file     Physically abused: Not on file     Forced sexual activity: Not on file   Other Topics Concern    Not on file   Social History Narrative    Not on file     Current Outpatient Medications on File Prior to Visit   Medication Sig Dispense Refill    Multiple Vitamins-Minerals (RENAPLEX-D) TABS TAKE 1 TABLET BY MOUTH EVERY DAY AS DIRECTED       No current facility-administered medications on file prior to visit. Allergies:  Ace inhibitors, Benazepril, Ciprofloxacin, Shellfish allergy, and Sulfa antibiotics    Review of Systems   Constitutional: Positive for fatigue. Negative for activity change, appetite change, fever and unexpected weight change. Respiratory: Negative for apnea, cough, chest tightness and shortness of breath. Cardiovascular: Negative for chest pain, palpitations and leg swelling. Gastrointestinal: Negative for abdominal pain, blood in stool, constipation, diarrhea, nausea and vomiting. Musculoskeletal: Positive for arthralgias. Neurological: Negative for seizures and headaches. Psychiatric/Behavioral: Negative for hallucinations and suicidal ideas. Objective:   /64 (Site: Right Upper Arm, Position: Sitting, Cuff Size: Small Adult)   Pulse 81   Temp 97.9 °F (36.6 °C) (Temporal)   Resp 14   Ht 5' 1\" (1.549 m)   Wt 135 lb (61.2 kg)   SpO2 98%   Breastfeeding No   BMI 25.51 kg/m²     Physical Exam  Vitals signs and nursing note reviewed. Constitutional:       General: She is not in acute distress. Appearance: Normal appearance. She is well-developed. She is not diaphoretic. HENT:      Head: Normocephalic and atraumatic. Nose: Nose normal.      Mouth/Throat:      Mouth: Mucous membranes are moist.      Pharynx: Oropharynx is clear. Eyes:      Conjunctiva/sclera: Conjunctivae normal.      Pupils: Pupils are equal, round, and reactive to light.    Neck: Musculoskeletal: Normal range of motion. Cardiovascular:      Rate and Rhythm: Normal rate and regular rhythm. Heart sounds: Normal heart sounds. No murmur. No friction rub. No gallop. Pulmonary:      Effort: Pulmonary effort is normal. No respiratory distress. Breath sounds: Normal breath sounds. No wheezing or rales. Chest:      Chest wall: No tenderness. Abdominal:      General: Abdomen is flat. Bowel sounds are normal.      Palpations: Abdomen is soft. Tenderness: There is no abdominal tenderness. Skin:     General: Skin is warm and dry. Neurological:      Mental Status: She is alert and oriented to person, place, and time. Psychiatric:         Behavior: Behavior normal.         Thought Content: Thought content normal.         Judgment: Judgment normal.         Assessment & Plan:     1. Encounter for screening mammogram for breast cancer    - DARRIUS DIGITAL SCREEN W OR WO CAD BILATERAL; Future    2. Screening for colon cancer  Minus     3. Stage 3 chronic kidney disease, unspecified whether stage 3a or 3b CKD  Continue to follow-up with nephrology  4. Essential hypertension  Controlled: Continue current medication    5. Anemia due to acute blood loss  Will obtain lab work from nephrology    6. Chronic obstructive pulmonary disease, unspecified COPD type ([facility] [address]  Obtain pulmonary function test and follow-up with pulmonology  - Full PFT Study With Bronchodilator; Future  - Ambulatory referral to Pulmonology    7. Gastrointestinal hemorrhage associated with duodenal ulcer  Have patient establish with gastroenterology  - Ambulatory referral to Gastroenterology      Return if symptoms worsen or fail to improve.     Sheldon Mendez MD

## 2021-01-15 DIAGNOSIS — K26.4 GASTROINTESTINAL HEMORRHAGE ASSOCIATED WITH DUODENAL ULCER: Primary | ICD-10-CM

## 2021-01-15 DIAGNOSIS — I10 ESSENTIAL HYPERTENSION: ICD-10-CM

## 2021-01-15 RX ORDER — OMEPRAZOLE 20 MG/1
20 CAPSULE, DELAYED RELEASE ORAL DAILY
Qty: 90 CAPSULE | Refills: 1 | Status: SHIPPED | OUTPATIENT
Start: 2021-01-15

## 2021-01-27 DIAGNOSIS — I10 ESSENTIAL HYPERTENSION: ICD-10-CM

## 2021-02-01 ENCOUNTER — VIRTUAL VISIT (OUTPATIENT)
Dept: GASTROENTEROLOGY | Age: 71
End: 2021-02-01
Payer: MEDICARE

## 2021-02-01 ENCOUNTER — TELEPHONE (OUTPATIENT)
Dept: FAMILY MEDICINE CLINIC | Age: 71
End: 2021-02-01

## 2021-02-01 DIAGNOSIS — Z87.19 HISTORY OF GI BLEED: Primary | ICD-10-CM

## 2021-02-01 PROCEDURE — 99443 PR PHYS/QHP TELEPHONE EVALUATION 21-30 MIN: CPT | Performed by: INTERNAL MEDICINE

## 2021-02-01 NOTE — PROGRESS NOTES
No previous colonoscopy -patient not keen on colonoscopy. No previous Cologuard    Review of Systems   All other systems reviewed and are negative. Prior to Visit Medications    Medication Sig Taking? Authorizing Provider   Metoprolol Succinate 25 MG CS24 Take 1 tablet by mouth daily Yes Chrsitiane Pleitez MD   omeprazole (PRILOSEC) 20 MG delayed release capsule Take 1 capsule by mouth daily Yes Christiane Pleitez MD   Multiple Vitamins-Minerals (RENAPLEX-D) TABS TAKE 1 TABLET BY MOUTH EVERY DAY AS DIRECTED Yes Historical Provider, MD     Social History     Tobacco Use    Smoking status: Former Smoker     Quit date: 2020     Years since quittin.5    Smokeless tobacco: Never Used   Substance Use Topics    Alcohol use: Never     Frequency: Never    Drug use: Never        Allergies   Allergen Reactions    Ace Inhibitors     Benazepril     Ciprofloxacin     Shellfish Allergy     Sulfa Antibiotics    , History reviewed. No pertinent past medical history. , History reviewed. No pertinent surgical history. , History reviewed. No pertinent family history. Limited information on physical examination: Due to this being a telehealth visit, physical examination could not be performed    Laboratory, Pathology, Radiology reviewed indetail with relevant important investigations summarized below:  Lab Results   Component Value Date    WBC 28.0 (H) 2020    HGB 6.4 (LL) 2020    HCT 18.8 (L) 2020    MCV 87 2020     (L) 2020     Lab Results   Component Value Date    ALT 41 2020    AST 29 2020    ALKPHOS 52 2020    BILITOT 0.5 2020     Assessment and Plan:  66 y.o. female with history of significant GI bleed with emergent endoscopy showing 3 ulcers that required endoscopic intervention. Clinical history unclear as to the etiology of the ulcers. Patient had a prolonged hospitalization and significant blood transfusion. Since discharge on omeprazole and doing well  -Obtain old records  -Continue with PPI therapy    Return if symptoms worsen or fail to improve. This visit was completed over telephone, with patient at their home and provider at the hospital, total time spent conversing with the patient 25 minutes, other personnel involved in the care are  staff and Medical assistant. Doni Anand MD   Gastroenterology  [facility]    Pursuant to the emergency declaration under the [facility], [address]19 waiver authority and the Coronavirus Preparedness and [employer] Act, this Virtual Visit was conducted, with patient's consent, to reduce the patient's risk of exposure to COVID-19 and provide continuity of care for an established patient. Please note this report has been partially produced using speech recognition software and may cause contain errors related to thatsystem including grammar, punctuation and spelling as well as words and phrases that may seem inappropriate. If there are questions or concerns please feel free to contact me to clarify.

## 2021-02-01 NOTE — TELEPHONE ENCOUNTER
Ynes Christensen from [facility] order called said that the prescription Metoprolol Succinate 25 MG CS24 says take 1 tablet by mouth daily and it says 90 capsule her insurance will pay for tablets but the prescription needs to say take 1 tablet by mouth daily and have 90 tablets it needs to be corrected and can call [phone] reference number [id number]. cp

## 2021-02-02 ENCOUNTER — HOSPITAL ENCOUNTER (INPATIENT)
Age: 71
LOS: 8 days | Discharge: NURSING, REHAB OR LONG-TERM CARE | DRG: 299 | End: 2021-02-10
Attending: INTERNAL MEDICINE | Admitting: INTERNAL MEDICINE
Payer: MEDICARE

## 2021-02-02 ENCOUNTER — APPOINTMENT (OUTPATIENT)
Dept: GENERAL RADIOLOGY | Age: 71
End: 2021-02-02
Payer: MEDICARE

## 2021-02-02 ENCOUNTER — TELEPHONE (OUTPATIENT)
Dept: FAMILY MEDICINE CLINIC | Age: 71
End: 2021-02-02

## 2021-02-02 ENCOUNTER — HOSPITAL ENCOUNTER (EMERGENCY)
Age: 71
Discharge: ANOTHER HOSPITAL | End: 2021-02-02
Attending: EMERGENCY MEDICINE
Payer: MEDICARE

## 2021-02-02 ENCOUNTER — APPOINTMENT (OUTPATIENT)
Dept: CT IMAGING | Age: 71
End: 2021-02-02
Payer: MEDICARE

## 2021-02-02 VITALS
WEIGHT: 134 LBS | BODY MASS INDEX: 24.66 KG/M2 | TEMPERATURE: 98.1 F | SYSTOLIC BLOOD PRESSURE: 98 MMHG | DIASTOLIC BLOOD PRESSURE: 58 MMHG | OXYGEN SATURATION: 97 % | HEIGHT: 62 IN | RESPIRATION RATE: 14 BRPM | HEART RATE: 77 BPM

## 2021-02-02 DIAGNOSIS — R53.1 GENERALIZED WEAKNESS: Primary | ICD-10-CM

## 2021-02-02 DIAGNOSIS — S43.402A SPRAIN OF LEFT SHOULDER, UNSPECIFIED SHOULDER SPRAIN TYPE, INITIAL ENCOUNTER: ICD-10-CM

## 2021-02-02 DIAGNOSIS — M54.2 NECK PAIN: ICD-10-CM

## 2021-02-02 DIAGNOSIS — R93.89 ABNORMAL CAT SCAN: ICD-10-CM

## 2021-02-02 DIAGNOSIS — M89.9 LYTIC LESION OF BONE ON X-RAY: Primary | ICD-10-CM

## 2021-02-02 DIAGNOSIS — I10 ESSENTIAL HYPERTENSION: ICD-10-CM

## 2021-02-02 LAB
ALBUMIN SERPL-MCNC: 1.9 G/DL (ref 3.5–4.6)
ALP BLD-CCNC: 189 U/L (ref 40–130)
ALT SERPL-CCNC: 6 U/L (ref 0–33)
ANION GAP SERPL CALCULATED.3IONS-SCNC: 13 MEQ/L (ref 9–15)
AST SERPL-CCNC: 27 U/L (ref 0–35)
BACTERIA: ABNORMAL /HPF
BASOPHILS ABSOLUTE: 0.1 K/UL (ref 0–0.2)
BASOPHILS RELATIVE PERCENT: 0.7 %
BILIRUB SERPL-MCNC: 0.6 MG/DL (ref 0.2–0.7)
BILIRUBIN URINE: NEGATIVE
BLOOD, URINE: NEGATIVE
BUN BLDV-MCNC: 11 MG/DL (ref 8–23)
CALCIUM SERPL-MCNC: 8.5 MG/DL (ref 8.5–9.9)
CHLORIDE BLD-SCNC: 97 MEQ/L (ref 95–107)
CLARITY: CLEAR
CO2: 24 MEQ/L (ref 20–31)
COLOR: YELLOW
CREAT SERPL-MCNC: 3.76 MG/DL (ref 0.5–0.9)
EKG ATRIAL RATE: 85 BPM
EKG P AXIS: 33 DEGREES
EKG P-R INTERVAL: 132 MS
EKG Q-T INTERVAL: 404 MS
EKG QRS DURATION: 90 MS
EKG QTC CALCULATION (BAZETT): 480 MS
EKG R AXIS: 10 DEGREES
EKG T AXIS: 30 DEGREES
EKG VENTRICULAR RATE: 85 BPM
EOSINOPHILS ABSOLUTE: 0 K/UL (ref 0–0.7)
EOSINOPHILS RELATIVE PERCENT: 0.3 %
EPITHELIAL CELLS, UA: ABNORMAL /HPF
GFR AFRICAN AMERICAN: 14.3
GFR NON-AFRICAN AMERICAN: 11.8
GLOBULIN: 3.6 G/DL (ref 2.3–3.5)
GLUCOSE BLD-MCNC: 119 MG/DL (ref 70–99)
GLUCOSE URINE: NEGATIVE MG/DL
HCT VFR BLD CALC: 33.8 % (ref 37–47)
HEMOGLOBIN: 11 G/DL (ref 12–16)
KETONES, URINE: NEGATIVE MG/DL
LEUKOCYTE ESTERASE, URINE: NEGATIVE
LIPASE: 9 U/L (ref 12–95)
LYMPHOCYTES ABSOLUTE: 2.7 K/UL (ref 1–4.8)
LYMPHOCYTES RELATIVE PERCENT: 18.5 %
MCH RBC QN AUTO: 31.8 PG (ref 27–31.3)
MCHC RBC AUTO-ENTMCNC: 32.6 % (ref 33–37)
MCV RBC AUTO: 97.4 FL (ref 82–100)
MONOCYTES ABSOLUTE: 0.7 K/UL (ref 0.2–0.8)
MONOCYTES RELATIVE PERCENT: 4.7 %
NEUTROPHILS ABSOLUTE: 10.9 K/UL (ref 1.4–6.5)
NEUTROPHILS RELATIVE PERCENT: 75.8 %
NITRITE, URINE: NEGATIVE
PDW BLD-RTO: 17.9 % (ref 11.5–14.5)
PH UA: 7.5 (ref 5–9)
PLATELET # BLD: 335 K/UL (ref 130–400)
POTASSIUM SERPL-SCNC: 3.5 MEQ/L (ref 3.4–4.9)
PROTEIN UA: 100 MG/DL
RBC # BLD: 3.47 M/UL (ref 4.2–5.4)
RBC UA: ABNORMAL /HPF (ref 0–2)
SARS-COV-2, NAAT: NOT DETECTED
SODIUM BLD-SCNC: 134 MEQ/L (ref 135–144)
SPECIFIC GRAVITY UA: 1.01 (ref 1–1.03)
TOTAL PROTEIN: 5.5 G/DL (ref 6.3–8)
TROPONIN: 0.02 NG/ML (ref 0–0.01)
URINE REFLEX TO CULTURE: ABNORMAL
UROBILINOGEN, URINE: 1 E.U./DL
WBC # BLD: 14.4 K/UL (ref 4.8–10.8)
WBC UA: ABNORMAL /HPF (ref 0–5)

## 2021-02-02 PROCEDURE — 1210000000 HC MED SURG R&B

## 2021-02-02 PROCEDURE — 81001 URINALYSIS AUTO W/SCOPE: CPT

## 2021-02-02 PROCEDURE — 36415 COLL VENOUS BLD VENIPUNCTURE: CPT

## 2021-02-02 PROCEDURE — 2580000003 HC RX 258: Performed by: EMERGENCY MEDICINE

## 2021-02-02 PROCEDURE — G0379 DIRECT REFER HOSPITAL OBSERV: HCPCS

## 2021-02-02 PROCEDURE — 99285 EMERGENCY DEPT VISIT HI MDM: CPT

## 2021-02-02 PROCEDURE — U0002 COVID-19 LAB TEST NON-CDC: HCPCS

## 2021-02-02 PROCEDURE — 85025 COMPLETE CBC W/AUTO DIFF WBC: CPT

## 2021-02-02 PROCEDURE — 93005 ELECTROCARDIOGRAM TRACING: CPT

## 2021-02-02 PROCEDURE — 84484 ASSAY OF TROPONIN QUANT: CPT

## 2021-02-02 PROCEDURE — 6370000000 HC RX 637 (ALT 250 FOR IP): Performed by: EMERGENCY MEDICINE

## 2021-02-02 PROCEDURE — 83690 ASSAY OF LIPASE: CPT

## 2021-02-02 PROCEDURE — 70450 CT HEAD/BRAIN W/O DYE: CPT

## 2021-02-02 PROCEDURE — G0378 HOSPITAL OBSERVATION PER HR: HCPCS

## 2021-02-02 PROCEDURE — 93010 ELECTROCARDIOGRAM REPORT: CPT | Performed by: INTERNAL MEDICINE

## 2021-02-02 PROCEDURE — 80053 COMPREHEN METABOLIC PANEL: CPT

## 2021-02-02 PROCEDURE — 71045 X-RAY EXAM CHEST 1 VIEW: CPT

## 2021-02-02 RX ORDER — 0.9 % SODIUM CHLORIDE 0.9 %
1000 INTRAVENOUS SOLUTION INTRAVENOUS ONCE
Status: COMPLETED | OUTPATIENT
Start: 2021-02-02 | End: 2021-02-02

## 2021-02-02 RX ORDER — SODIUM CHLORIDE 0.9 % (FLUSH) 0.9 %
3 SYRINGE (ML) INJECTION EVERY 8 HOURS
Status: DISCONTINUED | OUTPATIENT
Start: 2021-02-02 | End: 2021-02-02 | Stop reason: HOSPADM

## 2021-02-02 RX ORDER — ACETAMINOPHEN 500 MG
1000 TABLET ORAL ONCE
Status: COMPLETED | OUTPATIENT
Start: 2021-02-02 | End: 2021-02-02

## 2021-02-02 RX ORDER — METOPROLOL SUCCINATE 25 MG/1
25 TABLET, EXTENDED RELEASE ORAL DAILY
Qty: 90 TABLET | Refills: 1 | Status: SHIPPED | OUTPATIENT
Start: 2021-02-02

## 2021-02-02 RX ADMIN — Medication 3 ML: at 13:49

## 2021-02-02 RX ADMIN — ACETAMINOPHEN 1000 MG: 500 TABLET ORAL at 18:34

## 2021-02-02 RX ADMIN — SODIUM CHLORIDE 1000 ML: 9 INJECTION, SOLUTION INTRAVENOUS at 13:49

## 2021-02-02 ASSESSMENT — PAIN DESCRIPTION - ONSET: ONSET: ON-GOING

## 2021-02-02 ASSESSMENT — PAIN DESCRIPTION - PROGRESSION: CLINICAL_PROGRESSION: GRADUALLY WORSENING

## 2021-02-02 ASSESSMENT — PAIN DESCRIPTION - FREQUENCY: FREQUENCY: CONTINUOUS

## 2021-02-02 NOTE — TELEPHONE ENCOUNTER
Called patient and spoke to her.  She is in the Er and will be transferred to [facility]

## 2021-02-02 NOTE — ED NOTES
Called transfer center, talked to Amaya Grady, she will page the [facility] hospitalist for Dr. Jayce Jordan.   Bryant Trejo [facility]  02/02/21 5740

## 2021-02-02 NOTE — ED TRIAGE NOTES
Pt from home for c/o fatigue, getting worse and Pt is on Dialysis Tues, [address], Sat. Pt having left sided weakness since June, 2020 and muscle pain from the neck area radiating down left side. Pain currently rated 8/10, nothing taken for pain today. Pt plan of care explained to Pt at bedside during exam by Abram Fragoso.

## 2021-02-02 NOTE — ED PROVIDER NOTES
Name: Arturo Bach  Age: 66 y.o. Gender: female  CodeStatus: No Order  Allergies: Ace Inhibitors  Benazepril  Ciprofloxacin  Shellfish Allergy  Sulfa Antibiotics    Chief Complaint:Fatigue (getting worse oveertime; dialysis patient ), Extremity Weakness (left sided weakness, ongoing issue since June 2020), and Muscle Pain (left sided pain and weakness from neck all the way down the left side )    Primary Care Provider: Angelito Brennan MD  Date of Service: [unfilled]     No past medical history on file. No past surgical history on file. Medications:  Reviewed    Infusion Medications:   Scheduled Medications:    sodium chloride flush  3 mL Intravenous Q8H    sodium chloride  1,000 mL Intravenous Once     PRN Meds:     Subjective:     CC/HPI: 73-year-old female to the emergency department via EMS with chief complaint of generalized weakness. Patient states that she has been weak and fatigued for several months. Patient states that in June she had angioedema secondary to an ACE inhibitor causing her to be intubated and in ICU and sent to [facility].  Patient states that she was in the hospital for an extended period of time and then went to rehab. Patient states she developed gastric ulcers with bleeding and was readmitted. Since the initial ICU stay patient states that she is frequently felt weak and fatigued which has been worse over the last week. Patient states she has also noticed a discomfort in the left side of her neck and into her shoulder for approximately the last 7 to 10 days. Patient believes that she may have reached for something over her head at the onset. Patient denies chest discomfort or shortness of breath. No fever no chills. Patient states occasional cough which is not unusual for her. Patient states she is on dialysis and last received dialysis on Saturday. Was due to be dialyzed today today. In speaking with the patient's son.   Son states that patient had a pulmonary mass noted on previous CAT scan. Patient chose not to have it evaluated. Son not sure if it was found 6 months ago when patient was hospitalized. Believes it may have been there even before. VITALS/PMH/PSH: Reviewed per nurses notes    REVIEW OF SYSTEMS: As in chief complaint history of present illness, otherwise all other systems are reviewed and negative the total 10 systems reviewed    GEN: Pt alert and oriented, no acute distress. Flat affect. HEENT:         Normocephalic/Atramatic        PERRL, EOMI       Throat nonerythematous or edematous. Mucous membranes pasty no exudates noted. NECK: Nontender, no signs of trauma, no lymphadenopathy  HEART: Reg S1/S2, without murmer, rub or gallop  LUNGS: Clear to auscultation bilaterally, respirations even and unlabored. Dialysis catheter noted  ABDOMEN: soft, nontender, nondistended. No guarding rebound or rigidity  MUSCULOSKELETAL/EXTREMITITES:  No signs of trauma, cyanosis or edema. No CVA tenderness. No calf swelling or tenderness. LYMPH: no peripheral lympadenopathy noted  SKIN:  Warm & dry, no rash  NEUROLOGIC:  Alert and oriented. Speech clear        Labs:   No results for input(s): WBC, HGB, HCT, PLT in the last 72 hours. No results for input(s): NA, K, CL, CO2, BUN, CREATININE, CALCIUM, PHOS in the last 72 hours. Invalid input(s): MAGNES  No results for input(s): AST, ALT, BILIDIR, BILITOT, ALKPHOS in the last 72 hours. No results for input(s): INR in the last 72 hours. Invalid input(s):  PT,  PTT  No results for input(s): [place] in the last 72 hours. Urinalysis:   Lab Results   Component Value Date    NITRU NEGATIVE 09/10/2020    WBCUA 1 09/10/2020    BACTERIA 1+ 09/10/2020    RBCUA 1 09/10/2020    BLOODU NEGATIVE 09/10/2020       Radiology:   Most recent    Chest CT      WITH CONTRAST:No results found for this or any previous visit. WITHOUT CONTRAST: No results found for this or any previous visit.       No results found for this or any previous visit. CXR      2-view: No results found for this or any previous visit. Portable: No results found for this or any previous visit. Medical decision making/ED course;  IV was established and lab work was obtained and reviewed. EKG was obtained and interpreted by me as showing sinus rhythm with sinus arrhythmia and occasional PVC. Heart rate of 85. Nonspecific ST-T changes. No signs of acute ST elevation MI. Chest x-ray was obtained and interpreted by radiologist as showing no obvious acute pulmonary disease. Questionable small bilateral pleural effusions. CT of the head was obtained and interpreted by radiologist as showing lytic lesions along the calvarium suspicious for metastasis. There is significant delay initially in getting laboratory work as nursing staff needed multiple attempts to obtain IV access and obtain lab work. Also there was a significant delay of over an hour from the time decision made to admit the patient until able to speak with hospitalist about admission.     Case discussed with patient's son also discussed with Dr. Kwesi Laurent, who accepted the pt for transfer to [address] [place] [address] impression;  1 ) generalized weakness  2) history of lung mass  3) lytic calvarial lesions suspicious for metastasis    Disposition/plan;  Patient being transferred via [facility] to [facility] for further evaluation and treatment  Electronically signed by Pooja Cox DO on 2/2/2021 at 1:28 PM      Pooja Cox DO  02/02/21 9053

## 2021-02-02 NOTE — TELEPHONE ENCOUNTER
Son called in Patient is having a hard time at home getting around wobbly and she's been falling bad neck pain. He is going to bring her to the ED for treatment. He mentioned that she had a dialysis appointment to day at noon. I advised him the [facility] ED does not do dialysis that our [facility] in [facility] would be able to help with that.     He decided he would take her to her dialysis appointment then bring her here to [facility] to be seen at the ED

## 2021-02-02 NOTE — ED NOTES
Transfer center called with Dr. Anita Paniagua on the line for Dr. Armaan Bruce.   Dileep Forte Arrow  02/02/21 1820

## 2021-02-02 NOTE — ED NOTES
Called to transfer center, talked to [employer], asked her to repage the hospitalist (3rd page) for Dr. Thomas Sheets.   Maddi Boucher  02/02/21 1355

## 2021-02-03 ENCOUNTER — APPOINTMENT (OUTPATIENT)
Dept: GENERAL RADIOLOGY | Age: 71
DRG: 299 | End: 2021-02-03
Attending: INTERNAL MEDICINE
Payer: MEDICARE

## 2021-02-03 PROBLEM — R53.1 GENERAL WEAKNESS: Status: ACTIVE | Noted: 2021-02-03

## 2021-02-03 LAB
ALBUMIN SERPL-MCNC: 1.5 G/DL (ref 3.5–4.6)
ALP BLD-CCNC: 164 U/L (ref 40–130)
ALT SERPL-CCNC: 22 U/L (ref 0–33)
ANION GAP SERPL CALCULATED.3IONS-SCNC: 8 MEQ/L (ref 9–15)
AST SERPL-CCNC: 67 U/L (ref 0–35)
BASOPHILS ABSOLUTE: 0.2 K/UL (ref 0–0.2)
BASOPHILS RELATIVE PERCENT: 1.6 %
BILIRUB SERPL-MCNC: 0.5 MG/DL (ref 0.2–0.7)
BUN BLDV-MCNC: 12 MG/DL (ref 8–23)
CALCIUM SERPL-MCNC: 7.5 MG/DL (ref 8.5–9.9)
CHLORIDE BLD-SCNC: 101 MEQ/L (ref 95–107)
CO2: 26 MEQ/L (ref 20–31)
CREAT SERPL-MCNC: 4.06 MG/DL (ref 0.5–0.9)
EOSINOPHILS ABSOLUTE: 0.2 K/UL (ref 0–0.7)
EOSINOPHILS RELATIVE PERCENT: 1.4 %
GFR AFRICAN AMERICAN: 13.1
GFR NON-AFRICAN AMERICAN: 10.8
GLOBULIN: 2.8 G/DL (ref 2.3–3.5)
GLUCOSE BLD-MCNC: 81 MG/DL (ref 70–99)
HCT VFR BLD CALC: 26.7 % (ref 37–47)
HEMOGLOBIN: 8.7 G/DL (ref 12–16)
LYMPHOCYTES ABSOLUTE: 3.5 K/UL (ref 1–4.8)
LYMPHOCYTES RELATIVE PERCENT: 28 %
MCH RBC QN AUTO: 32.1 PG (ref 27–31.3)
MCHC RBC AUTO-ENTMCNC: 32.7 % (ref 33–37)
MCV RBC AUTO: 98.1 FL (ref 82–100)
MONOCYTES ABSOLUTE: 0.9 K/UL (ref 0.2–0.8)
MONOCYTES RELATIVE PERCENT: 6.8 %
NEUTROPHILS ABSOLUTE: 7.8 K/UL (ref 1.4–6.5)
NEUTROPHILS RELATIVE PERCENT: 62.2 %
PDW BLD-RTO: 16.8 % (ref 11.5–14.5)
PLATELET # BLD: 289 K/UL (ref 130–400)
POTASSIUM SERPL-SCNC: 3.4 MEQ/L (ref 3.4–4.9)
RBC # BLD: 2.72 M/UL (ref 4.2–5.4)
SODIUM BLD-SCNC: 135 MEQ/L (ref 135–144)
TOTAL PROTEIN: 4.3 G/DL (ref 6.3–8)
TROPONIN: 0.02 NG/ML (ref 0–0.01)
TROPONIN: 0.02 NG/ML (ref 0–0.01)
WBC # BLD: 12.6 K/UL (ref 4.8–10.8)

## 2021-02-03 PROCEDURE — 97162 PT EVAL MOD COMPLEX 30 MIN: CPT

## 2021-02-03 PROCEDURE — G0378 HOSPITAL OBSERVATION PER HR: HCPCS

## 2021-02-03 PROCEDURE — 96372 THER/PROPH/DIAG INJ SC/IM: CPT

## 2021-02-03 PROCEDURE — 6370000000 HC RX 637 (ALT 250 FOR IP): Performed by: INTERNAL MEDICINE

## 2021-02-03 PROCEDURE — 73030 X-RAY EXAM OF SHOULDER: CPT

## 2021-02-03 PROCEDURE — 90935 HEMODIALYSIS ONE EVALUATION: CPT

## 2021-02-03 PROCEDURE — 2580000003 HC RX 258: Performed by: INTERNAL MEDICINE

## 2021-02-03 PROCEDURE — 84165 PROTEIN E-PHORESIS SERUM: CPT

## 2021-02-03 PROCEDURE — 84155 ASSAY OF PROTEIN SERUM: CPT

## 2021-02-03 PROCEDURE — 85025 COMPLETE CBC W/AUTO DIFF WBC: CPT

## 2021-02-03 PROCEDURE — 5A1D70Z PERFORMANCE OF URINARY FILTRATION, INTERMITTENT, LESS THAN 6 HOURS PER DAY: ICD-10-PCS | Performed by: INTERNAL MEDICINE

## 2021-02-03 PROCEDURE — 84156 ASSAY OF PROTEIN URINE: CPT

## 2021-02-03 PROCEDURE — 80053 COMPREHEN METABOLIC PANEL: CPT

## 2021-02-03 PROCEDURE — 97166 OT EVAL MOD COMPLEX 45 MIN: CPT

## 2021-02-03 PROCEDURE — 83520 IMMUNOASSAY QUANT NOS NONAB: CPT

## 2021-02-03 PROCEDURE — 1210000000 HC MED SURG R&B

## 2021-02-03 PROCEDURE — 6360000002 HC RX W HCPCS: Performed by: INTERNAL MEDICINE

## 2021-02-03 PROCEDURE — 84484 ASSAY OF TROPONIN QUANT: CPT

## 2021-02-03 PROCEDURE — 73502 X-RAY EXAM HIP UNI 2-3 VIEWS: CPT

## 2021-02-03 PROCEDURE — 36415 COLL VENOUS BLD VENIPUNCTURE: CPT

## 2021-02-03 RX ORDER — SODIUM CHLORIDE 0.9 % (FLUSH) 0.9 %
10 SYRINGE (ML) INJECTION PRN
Status: DISCONTINUED | OUTPATIENT
Start: 2021-02-03 | End: 2021-02-11 | Stop reason: HOSPADM

## 2021-02-03 RX ORDER — LIDOCAINE 4 G/G
1 PATCH TOPICAL DAILY
Status: DISCONTINUED | OUTPATIENT
Start: 2021-02-03 | End: 2021-02-07

## 2021-02-03 RX ORDER — ANALGESIC BALM 1.74; 4.06 G/29G; G/29G
OINTMENT TOPICAL ONCE
Status: COMPLETED | OUTPATIENT
Start: 2021-02-03 | End: 2021-02-03

## 2021-02-03 RX ORDER — SODIUM CHLORIDE 9 MG/ML
INJECTION, SOLUTION INTRAVENOUS
Status: DISPENSED
Start: 2021-02-03 | End: 2021-02-04

## 2021-02-03 RX ORDER — PROMETHAZINE HYDROCHLORIDE 12.5 MG/1
12.5 TABLET ORAL EVERY 6 HOURS PRN
Status: DISCONTINUED | OUTPATIENT
Start: 2021-02-03 | End: 2021-02-11 | Stop reason: HOSPADM

## 2021-02-03 RX ORDER — PANTOPRAZOLE SODIUM 40 MG/1
40 TABLET, DELAYED RELEASE ORAL
Status: DISCONTINUED | OUTPATIENT
Start: 2021-02-04 | End: 2021-02-11 | Stop reason: HOSPADM

## 2021-02-03 RX ORDER — POLYETHYLENE GLYCOL 3350 17 G/17G
17 POWDER, FOR SOLUTION ORAL DAILY PRN
Status: DISCONTINUED | OUTPATIENT
Start: 2021-02-03 | End: 2021-02-11 | Stop reason: HOSPADM

## 2021-02-03 RX ORDER — SODIUM CHLORIDE 0.9 % (FLUSH) 0.9 %
10 SYRINGE (ML) INJECTION EVERY 12 HOURS SCHEDULED
Status: DISCONTINUED | OUTPATIENT
Start: 2021-02-03 | End: 2021-02-11 | Stop reason: HOSPADM

## 2021-02-03 RX ORDER — HEPARIN SODIUM 5000 [USP'U]/ML
5000 INJECTION, SOLUTION INTRAVENOUS; SUBCUTANEOUS EVERY 8 HOURS SCHEDULED
Status: DISCONTINUED | OUTPATIENT
Start: 2021-02-03 | End: 2021-02-11 | Stop reason: HOSPADM

## 2021-02-03 RX ORDER — TRAMADOL HYDROCHLORIDE 50 MG/1
50 TABLET ORAL EVERY 6 HOURS PRN
Status: DISCONTINUED | OUTPATIENT
Start: 2021-02-03 | End: 2021-02-11 | Stop reason: HOSPADM

## 2021-02-03 RX ORDER — ACETAMINOPHEN 325 MG/1
650 TABLET ORAL EVERY 6 HOURS PRN
Status: DISCONTINUED | OUTPATIENT
Start: 2021-02-03 | End: 2021-02-11 | Stop reason: HOSPADM

## 2021-02-03 RX ORDER — ONDANSETRON 2 MG/ML
4 INJECTION INTRAMUSCULAR; INTRAVENOUS EVERY 6 HOURS PRN
Status: DISCONTINUED | OUTPATIENT
Start: 2021-02-03 | End: 2021-02-11 | Stop reason: HOSPADM

## 2021-02-03 RX ORDER — ACETAMINOPHEN 650 MG/1
650 SUPPOSITORY RECTAL EVERY 6 HOURS PRN
Status: DISCONTINUED | OUTPATIENT
Start: 2021-02-03 | End: 2021-02-07

## 2021-02-03 RX ORDER — HEPARIN SODIUM 1000 [USP'U]/ML
2000 INJECTION, SOLUTION INTRAVENOUS; SUBCUTANEOUS ONCE
Status: DISCONTINUED | OUTPATIENT
Start: 2021-02-03 | End: 2021-02-11 | Stop reason: HOSPADM

## 2021-02-03 RX ORDER — HEPARIN SODIUM 1000 [USP'U]/ML
4000 INJECTION, SOLUTION INTRAVENOUS; SUBCUTANEOUS ONCE
Status: COMPLETED | OUTPATIENT
Start: 2021-02-03 | End: 2021-02-08

## 2021-02-03 RX ADMIN — ACETAMINOPHEN 650 MG: 325 TABLET ORAL at 08:18

## 2021-02-03 RX ADMIN — Medication 10 ML: at 21:04

## 2021-02-03 RX ADMIN — HEPARIN SODIUM 5000 UNITS: 5000 INJECTION INTRAVENOUS; SUBCUTANEOUS at 00:43

## 2021-02-03 RX ADMIN — HEPARIN SODIUM 5000 UNITS: 5000 INJECTION INTRAVENOUS; SUBCUTANEOUS at 08:18

## 2021-02-03 RX ADMIN — MENTHOL AND METHYL SALICYLATE: 7.6; 29 OINTMENT TOPICAL at 14:00

## 2021-02-03 RX ADMIN — ACETAMINOPHEN 650 MG: 325 TABLET ORAL at 21:09

## 2021-02-03 ASSESSMENT — PAIN SCALES - GENERAL
PAINLEVEL_OUTOF10: 0
PAINLEVEL_OUTOF10: 5
PAINLEVEL_OUTOF10: 8

## 2021-02-03 ASSESSMENT — PAIN DESCRIPTION - LOCATION: LOCATION: ELBOW;NECK

## 2021-02-03 ASSESSMENT — PAIN DESCRIPTION - ONSET: ONSET: ON-GOING

## 2021-02-03 ASSESSMENT — PAIN DESCRIPTION - FREQUENCY: FREQUENCY: CONTINUOUS

## 2021-02-03 ASSESSMENT — PAIN DESCRIPTION - ORIENTATION: ORIENTATION: LEFT

## 2021-02-03 NOTE — PROGRESS NOTES
[facility] OCCUPATIONAL THERAPY EVALUATION - ACUTE     NAME: Jessi Pablo  : 1950 (59 y.o.)  MRN: [id number]  CODE STATUS: Full Code  Room: X432E200-14    Date of Service: 2/3/2021    Patient Diagnosis(es): General weakness [R53.1]   No chief complaint on file. Patient Active Problem List    Diagnosis Date Noted    General weakness 2021    Gastrointestinal hemorrhage associated with duodenal ulcer 2020    Anemia due to acute blood loss 2020    Chronic obstructive pulmonary disease ([facility] [address] 2020    CKD (chronic kidney disease) stage 3, GFR 30-59 ml/min 2020    Essential hypertension 2020        Past Medical History:   Diagnosis Date    Dialysis patient [facility])     Port right side of chest for dialysis    GERD (gastroesophageal reflux disease)     Hypertension     Kidney disease      Past Surgical History:   Procedure Laterality Date    PORT SURGERY Right     chest for dialysis        Restrictions:Fall        Safety Devices: Safety Devices  Safety Devices in place: Yes  Type of devices: All fall risk precautions in place   Initially in place: No    Subjective:\"I don't want to go to a nursing home and I don't want to get Covid. \"       Pain Reassessment: Pt reports pain 8/10 left shoulder with pain patches in place.           Prior Level of Function:  Social/Functional History  Lives With: Spouse  Type of Home: House  Home Layout: One level  Home Access: Level entry  Bathroom Shower/Tub: Tub/Shower unit  Bathroom Equipment: Shower chair, Grab bars in shower, Hand-held shower  Home Equipment: Rolling walker  ADL Assistance: [address]: Needs assistance(son completes cooking; orders out to eat; indep laundry)  Ambulation Assistance: Independent(WW)  Transfer Assistance: Independent  Active : Yes(reports difficulty getting into and out of car)  Additional Comments: reports fall last week- reaching outside of walker- landed on buttocks OBJECTIVE:     Orientation Status:  Orientation  Overall Orientation Status: Within Functional Limits    Observation:  Observation/Palpation  Posture: Fair  Observation: rounded shoulders with mild flexed posture. Pt holding left UE in guarded position  Edema: right UE    Cognition Status:  Cognition  Overall Cognitive Status: WFL    Perception Status:  Perception  Overall Perceptual Status: WFL    Sensation Status:  Sensation  Overall Sensation Status: Impaired(intermittent numbness in toes)  Light Touch: Partial deficits in the RLE, Partial deficits in the LLE(intermittently)    Vision and Hearing Status:  Vision  Vision: Impaired  Vision Exceptions: Wears glasses at all times  Hearing  Hearing: Exceptions to [facility]  Hearing Exceptions: Hard of hearing/hearing concerns     ROM:   LUE AROM (degrees)  LUE General AROM: limited shoulder flexion and abduction  Left Hand AROM (degrees)  Left Hand AROM: WFL  Right Hand AROM (degrees)  Right Hand AROM: WFL    Strength:  LUE Strength  L Hand General: 3+/5  LUE Strength Comment: limited shoulder  RUE Strength  Gross RUE Strength: WFL  R Hand General: 4+/5    Coordination, Tone, Quality of Movement:    Tone RUE  RUE Tone: Normotonic  Tone LUE  LUE Tone: Normotonic  Coordination  Movements Are Fluid And Coordinated: No  Coordination and Movement description: Decreased speed, Left UE    Hand Dominance:       ADL Status:  ADL  Feeding: Unable to assess(comment)  Grooming: Setup  UE Bathing: Minimal assistance  LE Bathing: Minimal assistance  UE Dressing: Minimal assistance  LE Dressing: Minimal assistance  Toileting: Unable to assess(comment)          Therapy key for assistance levels    Independent = Pt. is able to perform task with no assistance but may require a device   Stand by assistance = Pt. does not perform task at an independent level but does not need physical assistance, requires verbal cues Minimal, Moderate, Maximal Assistance = Pt. requires physical assistance (25%, 50%, 75% assist from helper) for task but is able to actively participate in task   Dependent = Pt. requires total assistance with task and is not able to actively participate with task completion     Functional Mobility:     Transfers  Sit to stand: Contact guard assistance  Stand to sit: Contact guard assistance    Bed Mobility  Bed mobility  Supine to Sit: Stand by assistance  Sit to Supine: Supervision  Scooting:  Moderate assistance    Seated and Standing Balance:  Balance  Sitting Balance: Supervision  Standing Balance: Contact guard assistance    Functional Endurance:  Activity Tolerance  Activity Tolerance: Patient limited by fatigue  Activity Tolerance: fair-    D/C Recommendations:  OT D/C RECOMMENDATIONS  REQUIRES OT FOLLOW UP: Yes    Equipment Recommendations:       OT Education:        OT Follow Up:  OT D/C RECOMMENDATIONS  REQUIRES OT FOLLOW UP: Yes       Assessment/Discharge Disposition:     Performance deficits / Impairments: Decreased functional mobility , Decreased strength, Decreased endurance, Decreased posture, Decreased ADL status, Decreased high-level IADLs, Decreased balance, Decreased ROM  Prognosis: Good  Discharge Recommendations: Continue to assess pending progress  Decision Making: Medium Complexity  History: 3 complexities  Exam: 8 deficits  Assistance / Modification: MIn A    Six Click Score    How much help for putting on and taking off regular lower body clothing?: A Little  How much help for Bathing?: A Little  How much help for Toileting?: None  How much help for putting on and taking off regular upper body clothing?: A Little  How much help for taking care of personal grooming?: None  How much help for eating meals?: None  AM-[facility] Inpatient Daily Activity Raw Score: 21  AM-PAC Inpatient ADL T-Scale Score : 44.27  ADL Inpatient CMS 0-100% Score: 32.79    Plan:  Plan  Times per week: 1-4x/wk Current Treatment Recommendations: Strengthening, Functional Mobility Training, Patient/Caregiver Education & Training, ROM, Endurance Training, Balance Training, Neuromuscular Re-education, Safety Education & Training, Self-Care / ADL    Goals:   Patient will:    - Improve functional endurance to tolerate/complete 10-15 mins of ADL's  - Be independent in UB ADLs   - Be independent in LB ADLs  - Be independent in ADL transfers without LOB  - Be independent in toileting tasks  - Improve left UE strength and endurance to Fair+ in order to participate in self-care activities as projected. - Access appropriate D/C site with as few architectural barriers as possible.     Patient Goal: Patient goals : TO return to home      Discussed and agreed upon: Yes Comments:     Therapy Time:   OT Individual Minutes  Time In: 6568  Time Out: 1140  Minutes: 18    Eval: 18 minutes     Electronically signed by:    ISA Berry  3/7/6590, 11:53 AM Electronically signed by ISA Berry on 1/7/97 at 11:51 AM EST

## 2021-02-03 NOTE — CONSULTS
[address]arissa [address]os [address]                      [address], [address] [facility]                                  CONSULTATION    PATIENT NAME: Flip LANDERS                   :        1950  MED REC NO:   [id number]                            ROOM:       P216  ACCOUNT NO:   [de-identified]                           ADMIT DATE: 2021  PROVIDER:     Lamar Newton DO    CONSULT DATE:  2021    RENAL CONSULT    HISTORY OF PRESENT ILLNESS:  A 24-year-old  female being seen  due to requirements of dialysis. The patient does receive hemodialysis  Tuesday, Thursday and Saturday of each week. She was unable to go to  the dialysis unit due to pain suggestive of sciatica in her left hip. On admission to the hospital, the patient had a serum creatinine of 4  with normal electrolytes and albumin of 1.5 gm and a hemoglobin of 8.7  gm. The patient's only complaints are her left shoulder and left hip  surgery. CT scan of the head showed lytic lesions of the skull. The  patient relates being told of cancer in the past but refused treatment. Documentation of that cannot be obtained at this time at [facility].  Dr. Will Walden was notified of the abnormal changes and the need  for old records. The patient had no significant change in her globulin  levels to suggest myeloma. Primary source of lytic lesions uncertain at  this time. PAST MEDICAL HISTORY:  Hypertension, CKD IV, COPD, anemia ? malignancy  source, etiology uncertain. PAST SURGICAL HISTORY:  Right port dialysis catheter in place. HABITS:  Quit smoking on 2020. No alcohol use or opioids. MEDICATIONS:  At the time of her admission:  Toprol, Prilosec, multiple  vitamins. ALLERGIES TO MEDICATIONS:  Would be ACE INHIBITORS, CIPRO, SHELLFISH AND  SULFA. Review of systems and family history noncontributory.     PHYSICAL EXAMINATION: VITAL SIGNS:  5 feet 3 inches, 134 pounds. Blood pressure 112/60, heart  rate 78, respirations 16, afebrile. HEENT:  Normocephalic. Pupils equal and reactive to light. Extraocular  muscles are intact. CHEST:  Lungs are clear. Chest wall shows right dialysis catheter. CARDIOVASCULAR:  Heart is regular with 1/6 systolic murmur. ABDOMEN:  Shows no guarding or rigidity. Bowel sounds are present. EXTREMITIES:  Showed tenderness in the left shoulder anteriorly and  superior. The patient has tenderness on range of motion. Lower  extremities showed no edema, but the patient does have gluteal pain on  palpation. IMPRESSION:  1. End-stage renal disease, on hemodialysis support. 2.  Questionable history of malignancy with lytic lesions on CT scan of  the skull. 3.  Hypertension. 4.  Anemia. 5.  Lung mass diagnosis _____ hospital ?  Not noted on our chest x-ray. 6.  Pain in lumbar, left shoulder and hip. PLAN:  Dialysis support, pain management. Old records to decide on  primary side of malignancy. _____ of the urine protein electrophoresis.         Kendall Wiley DO    D: 02/03/2021 13:48:39       T: 02/03/2021 13:56:04     [id number]  Job#: [id number]     Doc#: [id number]    CC: negative

## 2021-02-03 NOTE — PLAN OF CARE
Nutrition Problem #1: Inadequate oral intake  Intervention: Food and/or Nutrient Delivery: Modify Current Diet, Start Oral Nutrition Supplement  Nutritional Goals: Intake >50% of meals/supplement. stable weight.

## 2021-02-03 NOTE — H&P
Hospitalist Group   History and Physical      CHIEF COMPLAINT: Fatigue, generalized weakness    History of Present Illness:  66 y.o. female with a history of end-stage renal disease on dialysis, hypertension presents with fatigue and generalized weakness. Patient mentioned that in June she had angioedema due to ACE inhibitor which ended up with her being intubated. After, patient was started on dialysis and had GI bleed with gastric ulcers requiring multiple transfusions. Patient mentioned that she has been fatigued and weak since then. However, her weakness has been worsening lately and for that reason she decided to come to the ED. Patient lives by herself and she usually takes care of herself. She goes to dialysis regularly and she is supposed to have dialysis today but she skipped and came to the ER. Patient was told in the past that she had an nodule in her lung that need to be followed few years ago but the patient did not want further work-up on it. In the ER at [facility], CT head showed lytic calvarial lesions that are suspicious for metastatic disease. REVIEW OF SYSTEMS:  no fevers, chills, cp, sob, n/v, ha, vision/hearing changes, wt changes, hot/cold flashes, other open skin lesions, diarrhea, constipation, dysuria/hematuria unless noted in HPI. Complete ROS performed with the patient and is otherwise negative. PMH:  Past Medical History:   Diagnosis Date    Dialysis patient [facility])     Port right side of chest for dialysis    GERD (gastroesophageal reflux disease)     Hypertension     Kidney disease        Surgical History:  Past Surgical History:   Procedure Laterality Date    PORT SURGERY Right     chest for dialysis       Medications Prior to Admission:    Prior to Admission medications    Medication Sig Start Date End Date Taking?  Authorizing Provider   metoprolol succinate (TOPROL XL) 25 MG extended release tablet Take 1 tablet by mouth daily 2/2/21   Amanuel Pelletier MD omeprazole (PRILOSEC) 20 MG delayed release capsule Take 1 capsule by mouth daily 1/15/21   Maycol Soria MD   Multiple Vitamins-Minerals (RENAPLEX-D) TABS TAKE 1 TABLET BY MOUTH EVERY DAY AS DIRECTED 12/21/20   Historical Provider, MD       Allergies:    Ace inhibitors, Benazepril, Ciprofloxacin, Shellfish allergy, and Sulfa antibiotics    Social History:    reports that she quit smoking about 6 months ago. She has never used smokeless tobacco. She reports that she does not drink alcohol or use drugs. Family History:   No family history on file.     PHYSICAL EXAM:  Vitals:  /63   Pulse 78   Temp 98.1 °F (36.7 °C) (Oral)   Resp 16   Ht 5' 3\" (1.6 m)   Wt 134 lb (60.8 kg)   SpO2 96%   BMI 23.74 kg/m²   General Appearance: alert and oriented to person, place and time, well developed and well- nourished, in no acute distress  Skin: warm and dry, no rash or erythema  Head: normocephalic and atraumatic  Eyes: pupils equal, round, and reactive to light, extraocular eye movements intact, conjunctivae normal  ENT: tympanic membrane, external ear and ear canal normal bilaterally, nose without deformity, nasal mucosa and turbinates normal without polyps  Neck: supple and non-tender without mass, no thyromegaly or thyroid nodules, no cervical lymphadenopathy  Pulmonary/Chest: clear to auscultation bilaterally- no wheezes   Cardiovascular: normal rate, regular rhythm, normal S1 and S2, no murmurs   Abdomen: soft, non-tender, non-distended, normal bowel sounds, no masses or organomegaly  Extremities: no cyanosis, clubbing    Musculoskeletal: normal range of motion, no joint swelling, deformity or tenderness  Neurologic: reflexes normal and symmetric, no cranial nerve deficit, no focal neurological deficit noted      LABS:  Recent Labs     02/02/21  1442   *   K 3.5   CL 97   CO2 24   BUN 11   CREATININE 3.76*   GLUCOSE 119*   CALCIUM 8.5       Recent Labs     02/02/21  1442   WBC 14.4*   RBC 3.47* HGB 11.0*   HCT 33.8*   MCV 97.4   MCH 31.8*   MCHC 32.6*   RDW 17.9*          No results for input(s): POCGLU in the last 72 hours.     CBC with Differential:    Lab Results   Component Value Date    WBC 14.4 02/02/2021    RBC 3.47 02/02/2021    HGB 11.0 02/02/2021    HCT 33.8 02/02/2021     02/02/2021    MCV 97.4 02/02/2021    MCH 31.8 02/02/2021    MCHC 32.6 02/02/2021    RDW 17.9 02/02/2021    NRBC 2.0 08/01/2020    LYMPHOPCT 18.5 02/02/2021    MONOPCT 4.7 02/02/2021    BASOPCT 0.7 02/02/2021    MONOSABS 0.7 02/02/2021    LYMPHSABS 2.7 02/02/2021    EOSABS 0.0 02/02/2021    BASOSABS 0.1 02/02/2021     CMP:    Lab Results   Component Value Date     02/02/2021    K 3.5 02/02/2021    CL 97 02/02/2021    CO2 24 02/02/2021    BUN 11 02/02/2021    CREATININE 3.76 02/02/2021    GFRAA 14.3 02/02/2021    LABGLOM 11.8 02/02/2021    GLUCOSE 119 02/02/2021    GLUCOSE 78 12/08/2020    PROT 5.5 02/02/2021    LABALBU 1.9 02/02/2021    LABALBU 2.2 11/16/2020    CALCIUM 8.5 02/02/2021    BILITOT 0.6 02/02/2021    ALKPHOS 189 02/02/2021    AST 27 02/02/2021    ALT 6 02/02/2021       Radiology: Ct Head Wo Contrast    Result Date: 2/2/2021 CT HEAD WO CONTRAST : 2/2/2021 CLINICAL HISTORY:  weakness . COMPARISON: None available. TECHNIQUE: Spiral unenhanced images were obtained of the head, with routine multiplanar reconstructions performed. All CT scans at this facility use dose modulation, iterative reconstruction, and/or weight based dosing when appropriate to reduce radiation dose to as low as reasonably achievable. FINDINGS: Destructive lytic lesions with surrounding soft tissue density are present within the posterior right parietal bone, at the vertex, the left lateral orbital wall, and to a lesser extent elsewhere, which are suspicious for metastatic disease. The largest within the right posterior parietal calvarium measures approximately 2.5 cm. There is no intracranial hemorrhage, mass effect, midline shift, extra-axial collection, evidence of hydrocephalus, recent ischemic infarct, or displaced pathologic fracture identified. Mild generalized cerebral volume loss is present, with mild patchy supratentorial white matter changes most consistent with chronic small vessel ischemic disease. The mastoid air cells and visualized paranasal sinuses are essentially clear. SUSPICIOUS LYTIC CALVARIAL LESIONS FOR METASTATIC DISEASE, AS NOTED. OTHER POSSIBILITIES ARE THOUGHT TO BE LESS LIKELY. FURTHER EVALUATION IS SUGGESTED. NO ACUTE INTRACRANIAL HEMORRHAGE OR COMPLICATION IDENTIFIED. Xr Chest Portable    Result Date: 2/2/2021  Exam: XR CHEST PORTABLE History:  shoulder pain weakness Technique: AP portable view of the chest obtained. Comparison: None Chest x-ray portable Findings: There is a right internal jugular dialysis catheter in place . The cardiomediastinal silhouette is within normal limits. There are no infiltrates, consolidations or effusions. There is mild blunting of the left costophrenic recesses which may represent a small pleural effusion. Bones of the thorax appear intact. No acute cardiopulmonary changes. Mild blunting of the costophrenic recess on the left may be due to a small effusion versus prior scarring. EKG: Normal sinus rhythm with PVCsT wave inversion in the anterior leads with no previous EKG on file to compare    ASSESSMENT/ PLAN[de-identified]      Active Problems:    * No active hospital problems. *  Resolved Problems:    * No resolved hospital problems. *      1. Generalized weakness/fatigue   Has been going on since June   Patient was started on dialysis thencould be related to her generalized weakness   Patient is also on Toprol-XL which can cause fatigue   There is concern for malignancy given her CT scan head findings which can also cause malignancy   Patient lost 40 pounds since June   Will consult PT/OT    Patient might need rehab or placement  2. Calvarial lytic lesions   Incidental finding of multiple lytic lesions suspicious for metastasis on her CT head   Weight loss for the past 8 months which could also be due to other medical issues such as ESRD   Told to have a lung nodule that needed to be followed couple of years ago but patient did not want further investigation   Oncology consulted   Will follow the recommendation regarding the need for more imaging of the chest and abdomen  3. Elevated troponin   Troponin 0.019no previous troponin on file   We will cycle troponin and repeat EKG  4. End-stage renal disease   On dialysisdue for dialysis today but skipped will consult nephrology for dialysis in the morning  5.  Hypertension   On Toprol-XL alone   Due to her increased generalized weakness and fatiguewe will monitor off medication currently   Will resume Toprol-XL if needed    Code Status: Full  DVT prophylaxis: Heparin subcu         Electronically signed by Lawrence Figueroa MD on 2/2/2021 at 11:39 PM NOTE: This report was transcribed using voice recognition software. Every effort was made to ensure accuracy; however, inadvertent computerized transcription errors may be present.

## 2021-02-03 NOTE — PROGRESS NOTES
Physical Therapy  Facility/Department: [facility] [id number]/[id number]      PT evaluation attempted 2/3/21, at 8:10 AM EST, however this patient status is currently observation. Per facility protocol, PT evaluation and treatment orders for patients in observation status must include a PT specific diagnosis (i.e. \"difficulty ambulating\", \"lack of coordination\", etc.) These order specifications may be added to the \"comments\" section of the PT evaluation and treatment order. Requested updated Physical Therapy orders from referring provider via \"sticky note\". Coordination team notified.      We thank you for your referral!    [facility]) Physical Therapy Department    Electronically signed by Padma Hernandes PT on 2/3/21 at 8:10 AM EST

## 2021-02-03 NOTE — CONSULTS
Nephrology Consult     Assessment:  ESRDX IHD  Right dialysis catheter   Cranial lytic lesions prior cancer  Lung mass Dx [facility] ? Not CXR  Anemia  Lumbar / left  Shoulder/ hip Pain  Plan: dialysis today needs records  CT lung pain management  Myeloma testing  Dialysis today  Pain management  Etiology of lesion? Patient Active Problem List:     Gastrointestinal hemorrhage associated with duodenal ulcer     Anemia due to acute blood loss     Chronic obstructive pulmonary disease (HCC)     CKD (chronic kidney disease) stage 3, GFR 30-59 ml/min     Essential hypertension     General weakness      Subjective:  Admit Date: 2/2/2021    Interval History:pain left shoulder/hip    Medications:  Scheduled Meds:   sodium chloride flush  10 mL Intravenous 2 times per day    heparin (porcine)  5,000 Units Subcutaneous 3 times per day     Continuous Infusions:    CBC:   Recent Labs     02/02/21  1442 02/03/21  0515   WBC 14.4* 12.6*   HGB 11.0* 8.7*    289     CMP:    Recent Labs     02/02/21  1442 02/03/21  0515   * 135   K 3.5 3.4   CL 97 101   CO2 24 26   BUN 11 12   CREATININE 3.76* 4.06*   GLUCOSE 119* 81   CALCIUM 8.5 7.5*   LABGLOM 11.8* 10.8*     Troponin:   Recent Labs     02/03/21  0515   TROPONINI 0.019*     BNP: No results for input(s): BNP in the last 72 hours. INR: No results for input(s): INR in the last 72 hours. Lipids:   Recent Labs     02/02/21  1442   LIPASE 9*     Liver:   Recent Labs     02/03/21  0515   AST 67*   ALT 22   ALKPHOS 164*   PROT 4.3*   LABALBU 1.5*   BILITOT 0.5     Iron:  No results for input(s): IRONS, FERRITIN in the last 72 hours. Invalid input(s): LABIRONS  Urinalysis: No results for input(s): UA in the last 72 hours.     Objective:  Vitals: BP (!) 112/52   Pulse 79   Temp 97.9 °F (36.6 °C) (Oral)   Resp 16   Ht 5' 3\" (1.6 m)   Wt 134 lb (60.8 kg)   SpO2 92%   BMI 23.74 kg/m²    Wt Readings from Last 3 Encounters:   02/02/21 134 lb (60.8 kg) 02/02/21 134 lb (60.8 kg)   01/11/21 135 lb (61.2 kg)      24HR INTAKE/OUTPUT:      Intake/Output Summary (Last 24 hours) at 2/3/2021 0908  Last data filed at 2/3/2021 7905  Gross per 24 hour   Intake 240 ml   Output    Net 240 ml       General: alert, in no apparent distress  HEENT: normocephalic, atraumatic, anicteric  Neck: supple, no mass  Lungs: non-labored respirations, clear to auscultation bilaterally  Heart: regular rate and rhythm, no murmurs or rubs  Abdomen: soft, non-tender, non-distended  Ext: no cyanosis, no peripheral edema tender left shoulder and scitica  Neuro: alert and oriented, no gross abnormalities  Psych: normal mood and affect  Skin: no rash      Electronically signed by Harinder Bruce MD

## 2021-02-03 NOTE — PROGRESS NOTES
Comprehensive Nutrition Assessment    Type and Reason for Visit:  Initial, Positive Nutrition Screen    Nutrition Recommendations/Plan: Modify Current Diet (ALLI), Start Oral Nutrition Supplement (clear supplement x1/day & frozen supplement bid)    Nutrition Assessment:  Pt is at nutritional risk due to reported decreased appetite/intake, with weight loss. To monitor adequacy of intake/provide nutritional supplement with meals. Malnutrition Assessment:  Malnutrition Status: At risk for malnutrition (Comment)    Context:  Chronic Illness     Findings of the 6 clinical characteristics of malnutrition:  Energy Intake:  7 - 75% or less estimated energy requirements for 1 month or longer  Weight Loss:  Unable to assess     Body Fat Loss:  No significant body fat loss     Muscle Mass Loss:  No significant muscle mass loss    Fluid Accumulation:  Unable to assess     Strength:  Not Performed    Estimated Daily Nutrient Needs:  Energy (kcal):  3156-3316(kg x 25-27); Weight Used for Energy Requirements:  Admission     Protein (g):  61-73 (kg x 1.0-1.2); Weight Used for Protein Requirements:  Admission        Fluid (ml/day):  ~1600; Method Used for Fluid Requirements:  1 ml/kcal      Nutrition Related Findings:   PMH-CKD-HD, COPD, htn, duodenal ulcer. Pt reports decreased appetite/intake, loss of taste, and weight loss over the last ~6 months, with gradual improvement currently. Trace to +1 BLE edema noted. Pt agreeable to try supplements other than regular Ensure.      Wounds:  None       Current Nutrition Therapies:    DIET GENERAL; <50%    Anthropometric Measures:  · Height: 5' 3\" (160 cm)  · Current Body Weight:   please obtain actual weight  · Admission Body Weight: 134 lb (60.8 kg)(?source)    · Usual Body Weight: 149 lb (67.6 kg)((5/16/20 ?source); 174lb (7/2020,pt stated))     · Ideal Body Weight: 115 lbs; % Ideal Body Weight   >100%  · BMI:  23.8 · BMI Categories: Normal Weight (BMI 22.0 to 24.9) age over 72       Nutrition Diagnosis:   · Inadequate oral intake related to altered taste perception(c/o poor appetite) as evidenced by intake 0-25%, weight loss    Nutrition Interventions:   Food and/or Nutrient Delivery:  Modify Current Diet, Start Oral Nutrition Supplement  Nutrition Education/Counseling:  No recommendation at this time   Coordination of Nutrition Care:  Continue to monitor while inpatient    Goals:  Intake >50% of meals/supplement. stable weight.        Nutrition Monitoring and Evaluation:   Food/Nutrient Intake Outcomes:  Food and Nutrient Intake, Supplement Intake  Physical Signs/Symptoms Outcomes:  Weight, Biochemical Data, Fluid Status or Edema     Electronically signed by Kezia Hernandes RD, LD on 2/3/21 at 1:49 PM EST

## 2021-02-03 NOTE — PROGRESS NOTES
Patient arrived to room 287. A&Ox4. PERRLA. C/o aching pain starting from left shoulder down to fingertips. Denies numbness or tingling. Bilateral lower extremities weak. From home alone and uses a walker. States she last fell three days ago. Non-pitting edema to bilateral arms. Pt gets dialysis Tuesday, Thursday, Saturday. Last done on Saturday. Dialysis port to right upper chest. Pt states she is scheduled for surgery at the end of the month for a fistula. Home medications updated. Resting quietly in bed. No further needs at this time. Call light within reach. Will monitor.

## 2021-02-03 NOTE — FLOWSHEET NOTE
Assessment done. A&O x4 Lung sounds clear, BS present x4 Pt c/o left neck discomfort radiating to left elbow 8/10 Tylenol 2 tablets given Will continue to monitor. No other needs Call light in reach.

## 2021-02-03 NOTE — PROGRESS NOTES
[facility] Occupational Therapy      Date: 2/3/2021  Patient Name: Mariposa Begum        MRN: [id number]  Account: [de-identified]   : 1950  (66 y.o.)  Room: [place] [address]    Pt. is of observation status. To comply with medicare and insurance regulations, to see patient we require updated orders including a valid OT treatment diagnosis. Please reorder as appropriate.      Electronically signed by ISA Jimenes on 2/3/2021 at 8:02 AM

## 2021-02-03 NOTE — FLOWSHEET NOTE
Perfect serve message sent to Dr Goemz Nargis regarding patient's home medication and left neck pain.  Awaiting response

## 2021-02-03 NOTE — PROGRESS NOTES
Hospitalist Daily Progress Note  Name: Felecia Gray  Age: 66 y.o. Gender: female  CodeStatus: Full Code  Allergies: Ace Inhibitors  Benazepril  Ciprofloxacin  Shellfish Allergy  Sulfa Antibiotics    Chief Complaint:No chief complaint on file. Primary Care Provider: Harvey Haro MD  InpatientTreatment Team: Treatment Team: Attending Provider: Harpal Guevara DO; Consulting Physician: Chelsea Solis MD; Registered Nurse: Vani Glass RN; Registered Nurse: Magnolia Downs RN; Consulting Physician: Thor Grant MD; Consulting Physician: Roman Peguero DO  Admission Date: 2/2/2021      Subjective: Patient is seen and evaluated at bedside. Extensively attempted to review the history with the patient who denied any previous positive cancer screenings but has not been current with her breast cancer screening Pap smears or colon cancer screening. I did discuss the case with nephrology as well who was able to tease out a history of possible malignancy at some point noted in the lungs during her previous admission. Patient is afebrile vitals are stable. CT head results reviewed with the patient she complains mostly of left shoulder pain but cannot recall any recent injuries or accidents    Physical Exam  Constitutional:       Appearance: Normal appearance. She is obese. She is not ill-appearing or diaphoretic. HENT:      Head: Normocephalic and atraumatic. Nose: Nose normal.      Mouth/Throat:      Mouth: Mucous membranes are moist.      Pharynx: Oropharynx is clear. Eyes:      Conjunctiva/sclera: Conjunctivae normal.      Pupils: Pupils are equal, round, and reactive to light. Cardiovascular:      Rate and Rhythm: Normal rate. Heart sounds: Murmur present. No friction rub. No gallop. Pulmonary:      Breath sounds: No wheezing, rhonchi or rales. Abdominal:      General: There is no distension. Tenderness: There is no abdominal tenderness. There is no guarding. Musculoskeletal: Normal range of motion. Right lower leg: No edema. Left lower leg: No edema. Neurological:      General: No focal deficit present. Mental Status: She is alert and oriented to person, place, and time. Psychiatric:         Mood and Affect: Mood normal.         Thought Content: Thought content normal.         Judgment: Judgment normal.         Review of Systems  14 point ROS is reviewed negative except for as above  Medications:  Reviewed    Infusion Medications:    sodium chloride       Scheduled Medications:    sodium chloride flush  10 mL Intravenous 2 times per day    heparin (porcine)  5,000 Units Subcutaneous 3 times per day    heparin (porcine)  2,000 Units Intravenous Once    heparin (porcine)  4,000 Units Intercatheter Once    lidocaine  1 patch Transdermal Daily     PRN Meds: sodium chloride flush, promethazine **OR** ondansetron, polyethylene glycol, acetaminophen **OR** acetaminophen    Labs:   Recent Labs     02/02/21  1442 02/03/21  0515   WBC 14.4* 12.6*   HGB 11.0* 8.7*   HCT 33.8* 26.7*    289     Recent Labs     02/02/21  1442 02/03/21  0515   * 135   K 3.5 3.4   CL 97 101   CO2 24 26   BUN 11 12   CREATININE 3.76* 4.06*   CALCIUM 8.5 7.5*     Recent Labs     02/02/21  1442 02/03/21  0515   AST 27 67*   ALT 6 22   BILITOT 0.6 0.5   ALKPHOS 189* 164*     No results for input(s): INR in the last 72 hours. Recent Labs     02/02/21  1442 02/03/21  0049 02/03/21  0515   TROPONINI 0.019* 0.021* 0.019*       Urinalysis:   Lab Results   Component Value Date    NITRU Negative 02/02/2021    WBCUA 0-2 02/02/2021    WBCUA 1 09/10/2020    BACTERIA RARE 02/02/2021    RBCUA 0-2 02/02/2021    RBCUA 1 09/10/2020    BLOODU Negative 02/02/2021    SPECGRAV 1.015 02/02/2021    GLUCOSEU Negative 02/02/2021       Radiology:   Most recent    Chest CT      WITH CONTRAST:No results found for this or any previous visit. WITHOUT CONTRAST: No results found for this or any previous visit. CXR      2-view: No results found for this or any previous visit. Portable:   Results for orders placed during the hospital encounter of 02/02/21   XR CHEST PORTABLE    Narrative Exam: XR CHEST PORTABLE    History:  shoulder pain weakness     Technique: AP portable view of the chest obtained. Comparison: None    Chest x-ray portable   Findings: There is a right internal jugular dialysis catheter in place . The cardiomediastinal silhouette is within normal limits. There are no infiltrates, consolidations or effusions. There is mild blunting of the left costophrenic recesses which may represent a small pleural effusion. Bones of the thorax appear intact. Impression No acute cardiopulmonary changes. Mild blunting of the costophrenic recess on the left may be due to a small effusion versus prior scarring. Echo No results found for this or any previous visit. Assessment/Plan:    Active Hospital Problems    Diagnosis Date Noted    General weakness [R53.1] 02/03/2021     Generalized weakness with cranial lytic lesions: Records requested from [facility]. SPEP UPEP pending. PT and OT evaluation. Left shoulder imaging. Added ultram for pain and a lidocaine patch for pain. Oncology consulted for imaging recommendations. ESRD on hemodialysis: Nephrology following dialysis orders placed    GERD: Protonix  DVT prophylaxis Heparin    Additional work up or/and treatment plan may be added today or then after based on clinical progression. I am managing a portion of pt care. Some medical issues are handled byother specialists. Additional work up and treatment should be done in out pt setting by pt PCP and other out pt providers. In addition to examining and evaluating pt, I spent additional time explaining care, normaland abnormal findings, and treatment plan. All of pt questions were answered. Counseling, diet and education were provided. Case will be discussed with nursing staff when appropriate. Family will be updated if and whenappropriate.       Electronically signed by Rishi Mancera DO on 2/3/2021 at 3:42 PM

## 2021-02-03 NOTE — PROGRESS NOTES
Physical Therapy Med Surg Initial Assessment  Facility/Department: [facility]  Room: Z735/S771-33       NAME: Barrington Crowe  : 1950 (75 y.o.)  MRN: [id number]  CODE STATUS: Full Code    Date of Service: 2/3/2021    Patient Diagnosis(es): General weakness [R53.1]   No chief complaint on file. Patient Active Problem List    Diagnosis Date Noted    General weakness 2021    Gastrointestinal hemorrhage associated with duodenal ulcer 2020    Anemia due to acute blood loss 2020    Chronic obstructive pulmonary disease ([facility] [address] 2020    CKD (chronic kidney disease) stage 3, GFR 30-59 ml/min 2020    Essential hypertension 2020        Past Medical History:   Diagnosis Date    Dialysis patient [facility])     Port right side of chest for dialysis    GERD (gastroesophageal reflux disease)     Hypertension     Kidney disease      Past Surgical History:   Procedure Laterality Date    PORT SURGERY Right     chest for dialysis       Chart Reviewed: Yes  Patient assessed for rehabilitation services?: Yes  Family / Caregiver Present: No  General Comment  Comments: decreased insight into deficits; PT/OT co-eval    Restrictions:  Restrictions/Precautions: Fall Risk(high rincon score)     SUBJECTIVE: Subjective: \"I don't know if I need to go somewhere for more therapy. \"    Pain  Pre Treatment Pain Screening  Pain at present: 8(L shoulder)  Intervention List: Patient able to continue with treatment;Nurse/physician notified    Post Treatment Pain Screening:   Pain Assessment  Pain Level: 8(L shoulder)    Prior Level of Function:  Social/Functional History  Lives With: Spouse  Type of Home: House  Home Layout: One level  Home Access: Level entry  Bathroom Shower/Tub: Tub/Shower unit  Bathroom Equipment: Shower chair, Grab bars in shower, Hand-held shower  Home Equipment: Rolling walker  ADL Assistance: Independent Homemaking Assistance: Needs assistance(son completes cooking; orders out to eat; indep laundry)  Ambulation Assistance: Independent(WW)  Transfer Assistance: Independent  Active : Yes(reports difficulty getting into and out of car; son drives her to dialysis)  Additional Comments: reports fall last week- reaching outside of walker- landed on buttocks    OBJECTIVE:   Vision: Impaired  Vision Exceptions: Wears glasses at all times  Hearing: Exceptions to [facility]  Hearing Exceptions: Hard of hearing/hearing concerns    Cognition:  Overall Orientation Status: Within Functional Limits  Follows Commands: Within Functional Limits    Observation/Palpation  Observation: rounded shoulders with mild flexed posture.   Pt holding left UE in guarded position; mod to max fatigue with short distance gait training- no SOB; no c/o dizziness  Edema: right UE    ROM:  RLE AROM: WFL  LLE AROM : WFL    Strength:  Strength RLE  Comment: grossly 4+/5  Strength LLE  Comment: grossly 4+/5 except L hip 4/5    Neuro:  Balance  Posture: Fair(rounded shoulders, slouched posture)  Sitting - Static: Good  Sitting - Dynamic: Good  Standing - Static: Fair;-  Standing - Dynamic: Fair;-(pt requires B UE support to maintain steadiness during gait)     Tone RLE  RLE Tone: Normotonic  Tone LLE  LLE Tone: Normotonic  Motor Control  Gross Motor?: WFL  Sensation  Overall Sensation Status: Impaired(intermittent numbness in toes)    Bed mobility  Supine to Sit: Supervision(pt exhibits difficulty lifting trunk from supine to seated position)  Sit to Supine: Supervision  Comment: HOB elevated; pt using L rail;    Transfers  Sit to Stand: Minimal Assistance  Stand to sit: Contact guard assistance  Comment: [employer]    Ambulation  Ambulation?: Yes  Ambulation 1  Surface: level tile  Device: Rolling Walker  Assistance: Stand by assistance  Gait Deviations: Slow Ledy;Decreased step length;Decreased step height  Distance: 20 ft Comments: mod to max fatigue with gait 20ft distance    Activity Tolerance  Activity Tolerance: Patient limited by fatigue    PT Education  PT Education: Goals;PT Role;Transfer Training;Plan of Care;General Safety;Gait Training    ASSESSMENT:   Body structures, Functions, Activity limitations: Decreased functional mobility ; Decreased balance;Decreased endurance; Increased pain;Decreased strength;Decreased posture  Decision Making: Medium Complexity  History: med  Exam: med  Clinical Presentation: med    Prognosis: Good    DISCHARGE RECOMMENDATIONS:  Discharge Recommendations: Continue to assess pending progress, Patient would benefit from continued therapy after discharge    Assessment: Pt exhibits decreased insight into deficits, increased L shoulder pain, decreased activity tolerance, decreased posture, decreased LE strength. Pt requires increased assistance for all functional mobility due to impairments. Continued PT indicated. REQUIRES PT FOLLOW UP: Yes      PLAN OF CARE:  Plan  Times per week: 3-6  Current Treatment Recommendations: Strengthening, Functional Mobility Training, Wheelchair Mobility Training, Neuromuscular Re-education, Home Exercise Program, Equipment Evaluation, Education, & procurement, ROM, Transfer Training, Gait Training, Manual Therapy - Soft Tissue Mobilization, Safety Education & Training, Balance Training, Endurance Training, Stair training, Patient/Caregiver Education & Training  Safety Devices  Type of devices: Bed alarm in place, Call light within reach, Left in bed    Goals:  Patient goals : \"return home\"  Long term goals  Long term goal 1: Pt will demonstrate bed mobility indep to allow pt to get into and out of bed safely. Long term goal 2: Pt will demonstrate transfers mod indep with WW to allow pt to return home alone. Long term goal 3: Pt will demonstrate amb 100ft mod indep with WW to allow pt to amb inside her home with safety.     [facility] (6 CLICK) BASIC MOBILITY AM-PAC Inpatient Mobility Raw Score : 17    Therapy Time:   Individual   Time In 1123   Time Out 1140   Minutes [id number] [place], [place], 02/03/21 at 12:23 PM         Definitions for assistance levels  Independent = pt does not require any physical supervision or assistance from another person for activity completion. Device may be needed.   Stand by assistance = pt requires verbal cues or instructions from another person, close to but not touching, to perform the activity  Minimal assistance= pt performs 75% or more of the activity; assistance is required to complete the activity  Moderate assistance= pt performs 50% of the activity; assistance is required to complete the activity  Maximal assistance = pt performs 25% of the activity; assistance is required to complete the activity  Dependent = pt requires total physical assistance to accomplish the task

## 2021-02-04 LAB
ALBUMIN SERPL-MCNC: 1.6 G/DL (ref 3.5–4.6)
ALP BLD-CCNC: 186 U/L (ref 40–130)
ALT SERPL-CCNC: 45 U/L (ref 0–33)
ANION GAP SERPL CALCULATED.3IONS-SCNC: 6 MEQ/L (ref 9–15)
AST SERPL-CCNC: 155 U/L (ref 0–35)
BASOPHILS ABSOLUTE: 0.1 K/UL (ref 0–0.2)
BASOPHILS RELATIVE PERCENT: 0.6 %
BILIRUB SERPL-MCNC: 0.5 MG/DL (ref 0.2–0.7)
BUN BLDV-MCNC: 5 MG/DL (ref 8–23)
CALCIUM SERPL-MCNC: 7.3 MG/DL (ref 8.5–9.9)
CHLORIDE BLD-SCNC: 102 MEQ/L (ref 95–107)
CO2: 28 MEQ/L (ref 20–31)
CREAT SERPL-MCNC: 1.97 MG/DL (ref 0.5–0.9)
EOSINOPHILS ABSOLUTE: 0.1 K/UL (ref 0–0.7)
EOSINOPHILS RELATIVE PERCENT: 0.9 %
GFR AFRICAN AMERICAN: 30.1
GFR NON-AFRICAN AMERICAN: 24.9
GLOBULIN: 2.8 G/DL (ref 2.3–3.5)
GLUCOSE BLD-MCNC: 75 MG/DL (ref 70–99)
HCT VFR BLD CALC: 29.5 % (ref 37–47)
HEMOGLOBIN: 9.2 G/DL (ref 12–16)
LYMPHOCYTES ABSOLUTE: 4.1 K/UL (ref 1–4.8)
LYMPHOCYTES RELATIVE PERCENT: 32.5 %
MCH RBC QN AUTO: 31.2 PG (ref 27–31.3)
MCHC RBC AUTO-ENTMCNC: 31.2 % (ref 33–37)
MCV RBC AUTO: 99.9 FL (ref 82–100)
MONOCYTES ABSOLUTE: 0.8 K/UL (ref 0.2–0.8)
MONOCYTES RELATIVE PERCENT: 6.5 %
NEUTROPHILS ABSOLUTE: 7.5 K/UL (ref 1.4–6.5)
NEUTROPHILS RELATIVE PERCENT: 59.5 %
PDW BLD-RTO: 17.4 % (ref 11.5–14.5)
PLATELET # BLD: 307 K/UL (ref 130–400)
POTASSIUM SERPL-SCNC: 4 MEQ/L (ref 3.4–4.9)
RBC # BLD: 2.95 M/UL (ref 4.2–5.4)
SODIUM BLD-SCNC: 136 MEQ/L (ref 135–144)
TOTAL PROTEIN: 4.4 G/DL (ref 6.3–8)
WBC # BLD: 12.6 K/UL (ref 4.8–10.8)

## 2021-02-04 PROCEDURE — 6370000000 HC RX 637 (ALT 250 FOR IP): Performed by: INTERNAL MEDICINE

## 2021-02-04 PROCEDURE — G0378 HOSPITAL OBSERVATION PER HR: HCPCS

## 2021-02-04 PROCEDURE — 36415 COLL VENOUS BLD VENIPUNCTURE: CPT

## 2021-02-04 PROCEDURE — 1210000000 HC MED SURG R&B

## 2021-02-04 PROCEDURE — 6360000002 HC RX W HCPCS: Performed by: INTERNAL MEDICINE

## 2021-02-04 PROCEDURE — 92610 EVALUATE SWALLOWING FUNCTION: CPT

## 2021-02-04 PROCEDURE — 2580000003 HC RX 258: Performed by: INTERNAL MEDICINE

## 2021-02-04 PROCEDURE — 96372 THER/PROPH/DIAG INJ SC/IM: CPT

## 2021-02-04 PROCEDURE — 85025 COMPLETE CBC W/AUTO DIFF WBC: CPT

## 2021-02-04 PROCEDURE — 80053 COMPREHEN METABOLIC PANEL: CPT

## 2021-02-04 RX ADMIN — Medication 10 ML: at 10:02

## 2021-02-04 RX ADMIN — TRAMADOL HYDROCHLORIDE 50 MG: 50 TABLET, FILM COATED ORAL at 00:07

## 2021-02-04 RX ADMIN — PANTOPRAZOLE SODIUM 40 MG: 40 TABLET, DELAYED RELEASE ORAL at 05:57

## 2021-02-04 RX ADMIN — HEPARIN SODIUM 5000 UNITS: 5000 INJECTION INTRAVENOUS; SUBCUTANEOUS at 09:59

## 2021-02-04 RX ADMIN — TRAMADOL HYDROCHLORIDE 50 MG: 50 TABLET, FILM COATED ORAL at 09:59

## 2021-02-04 RX ADMIN — HEPARIN SODIUM 5000 UNITS: 5000 INJECTION INTRAVENOUS; SUBCUTANEOUS at 16:51

## 2021-02-04 RX ADMIN — TRAMADOL HYDROCHLORIDE 50 MG: 50 TABLET, FILM COATED ORAL at 18:47

## 2021-02-04 RX ADMIN — ACETAMINOPHEN 650 MG: 325 TABLET ORAL at 05:57

## 2021-02-04 RX ADMIN — Medication 10 ML: at 21:00

## 2021-02-04 ASSESSMENT — PAIN - FUNCTIONAL ASSESSMENT: PAIN_FUNCTIONAL_ASSESSMENT: 0-10

## 2021-02-04 ASSESSMENT — PAIN SCALES - GENERAL
PAINLEVEL_OUTOF10: 5
PAINLEVEL_OUTOF10: 0
PAINLEVEL_OUTOF10: 5
PAINLEVEL_OUTOF10: 0
PAINLEVEL_OUTOF10: 7
PAINLEVEL_OUTOF10: 4
PAINLEVEL_OUTOF10: 0
PAINLEVEL_OUTOF10: 3

## 2021-02-04 ASSESSMENT — PAIN DESCRIPTION - DESCRIPTORS: DESCRIPTORS: ACHING;THROBBING

## 2021-02-04 ASSESSMENT — PAIN DESCRIPTION - ORIENTATION: ORIENTATION: LEFT

## 2021-02-04 ASSESSMENT — PAIN DESCRIPTION - LOCATION
LOCATION: NECK
LOCATION: SHOULDER

## 2021-02-04 ASSESSMENT — PAIN DESCRIPTION - PAIN TYPE: TYPE: CHRONIC PAIN

## 2021-02-04 NOTE — CARE COORDINATION
No change in discharge plan. Patient will return to home with [facility], agreeable to  but only if necessary. CM will continue to follow for any changes.   Electronically signed by Glendora Phalen, RN on 2/4/2021 at 3:26 PM

## 2021-02-04 NOTE — PROGRESS NOTES
Device: Rolling Walker  Assistance: Stand by assistance  Quality of Gait: Forward flexed, Decreased step height and stride. Gait Deviations: Slow Ledy;Decreased step length;Decreased step height  Distance: 40'  Comments: Moderate fatigue with ambulation Declined second attempt. Stairs/Curb  Stairs?: No           ASSESSMENT   Body structures, Functions, Activity limitations: Decreased functional mobility ; Decreased balance;Decreased endurance; Increased pain;Decreased strength;Decreased posture  Assessment: Pt retropulsive with sit to stand. Tends to not reach for surface when sitting. Well motivated. Prognosis: Good  REQUIRES PT FOLLOW UP: Yes     Discharge Recommendations:  Continue to assess pending progress    Goals  Long term goals  Long term goal 1: Pt will demonstrate bed mobility indep to allow pt to get into and out of bed safely. Long term goal 2: Pt will demonstrate transfers mod indep with WW to allow pt to return home alone. Long term goal 3: Pt will demonstrate amb 100ft mod indep with WW to allow pt to amb inside her home with safety. Patient Goals   Patient goals : \"return home\"    PLAN    Times per week: 3-6  Safety Devices  Type of devices: Bed alarm in place, Call light within reach, Left in bed     [facility] (6 CLICK) Michael Car[address] 28 Inpatient Mobility Raw Score : 17   Therapy Time   Individual   Time In 1335   Time Out  1352   Minutes  17     Timed Code Treatment Minutes: 17 Minutes   TR 5  GT 12    Rosemarie Ruiz PTA, 02/04/21 at 1:56 PM         Definitions for assistance levels  Independent = pt does not require any physical supervision or assistance from another person for activity completion. Device may be needed.   Stand by assistance = pt requires verbal cues or instructions from another person, close to but not touching, to perform the activity  Minimal assistance= pt performs 75% or more of the activity; assistance is required to complete the activity Moderate assistance= pt performs 50% of the activity; assistance is required to complete the activity  Maximal assistance = pt performs 25% of the activity; assistance is required to complete the activity  Dependent = pt requires total physical assistance to accomplish the task

## 2021-02-04 NOTE — PROGRESS NOTES
[facility]   Facility/Department: [facility]  Speech Language Pathology  Clinical Bedside Swallow Evaluation    NAME:Caroline Miller  : 1950 (76 y.o.)   MRN: [id number]  [id number]  ADMISSION DATE: 2021  PATIENT DIAGNOSIS(ES): General weakness [R53.1]  No chief complaint on file. Patient Active Problem List    Diagnosis Date Noted    General weakness 2021    Gastrointestinal hemorrhage associated with duodenal ulcer 2020    Anemia due to acute blood loss 2020    Chronic obstructive pulmonary disease ([facility] [address] 2020    CKD (chronic kidney disease) stage 3, GFR 30-59 ml/min 2020    Essential hypertension 2020     Past Medical History:   Diagnosis Date    Dialysis patient [facility])     Port right side of chest for dialysis    GERD (gastroesophageal reflux disease)     Hypertension     Kidney disease      Past Surgical History:   Procedure Laterality Date    PORT SURGERY Right     chest for dialysis     Allergies   Allergen Reactions    Ace Inhibitors     Benazepril     Ciprofloxacin     Shellfish Allergy     Sulfa Antibiotics      DATE ONSET: 2021    Date of Evaluation: 2021   Evaluating Therapist: BRONSON Jacobson    Recommended Diet and Intervention  Diet Solids Recommendation: Regular  Liquid Consistency Recommendation: Thin  Recommended Form of Meds: PO  Recommendations: Self feed     Compensatory Swallowing Strategies  Compensatory Swallowing Strategies: Upright as possible for all oral intake;Small bites/sips    Reason for Referral  Rosalind Vasques was referred for a bedside swallow evaluation to assess the efficiency of her swallow function, identify signs and symptoms of aspiration and make recommendations regarding safe dietary consistencies, effective compensatory strategies, and safe eating environment. General  Chart Reviewed: Yes  Behavior/Cognition: Alert; Cooperative;Pleasant mood  Respiratory Status: Room air O2 Device: None (Room air)  Communication Observation: Functional  Dentition: Adequate; Some missing teeth  Patient Positioning: Upright in bed  Baseline Vocal Quality: Normal  Volitional Cough: Strong  Prior Dysphagia History: None  Consistencies Administered: Reg solid; Dysphagia Pureed (Dysphagia I); Thin - cup    Current Diet level:  Current Diet : Regular  Current Liquid Diet : Thin    Oral Motor Deficits  Oral/Motor  Oral Motor: Within functional limits    Oral Phase Dysfunction  Oral Phase  Oral Phase: WFL  Oral Phase  Oral Phase - Comment: WFL. Adequate mastication and oral clearance of bolus in all opportunities independently. Minimal oral residuals post swallow that the pt cleared independently with use of liquid wash. Indicators of Pharyngeal Phase Dysfunction   Pharyngeal Phase  Pharyngeal Phase: WFL  Pharyngeal Phase   Pharyngeal: WFL. Hyolaryngeal movement is present. No overt s/s of aspiration observed per all consistencies tested. Impression  Dysphagia Diagnosis: Swallow function appears grossly intact  Dysphagia Impression : WFL  Dysphagia Outcome Severity Scale: Level 6: Within functional limits/Modified independence     Treatment Plan  Requires SLP Intervention: No  Duration/Frequency of Treatment: no f/u reg/thin      Prognosis  Individuals consulted  Consulted and agree with results and recommendations: Patient;RN(CRUZ Vu)    Education  Patient Education: Results of BSE  Patient Education Response: Verbalizes understanding  Safety Devices in place: Yes  Type of devices: Bed alarm in place;Call light within reach    Pain Assessment:  Initial Assessment:  Patient denies pain. Re-assessment:  Patient denies pain.     [x]  Evaluation routed to ordering physician inbox    Therapy Time  SLP Individual Minutes  Time In: 2063  Time Out: 1547  Minutes: 10              Signature: Electronically signed by BRONSON Wolf on 2/4/2021 at 9:54 AM

## 2021-02-04 NOTE — PROGRESS NOTES
2103: Shift assessment complete, see flowsheet. Pt A/Ox4. She has just returned from dialysis. She states pain in her left shoulder. Tylenol given per MAR. She denies headache, dizziness, or racing heartbeat. She is in bed, awake, needs provided. 0000: Pt has refused her heparin and states, \"I think they gave me heparin down in dialysis. I would like to skip it to be safe. \"

## 2021-02-05 ENCOUNTER — APPOINTMENT (OUTPATIENT)
Dept: CT IMAGING | Age: 71
DRG: 299 | End: 2021-02-05
Attending: INTERNAL MEDICINE
Payer: MEDICARE

## 2021-02-05 LAB
ALBUMIN SERPL-MCNC: 1.7 G/DL (ref 3.5–4.6)
ALP BLD-CCNC: 210 U/L (ref 40–130)
ALT SERPL-CCNC: 47 U/L (ref 0–33)
ANION GAP SERPL CALCULATED.3IONS-SCNC: 7 MEQ/L (ref 9–15)
AST SERPL-CCNC: 88 U/L (ref 0–35)
BASOPHILS ABSOLUTE: 0.1 K/UL (ref 0–0.2)
BASOPHILS RELATIVE PERCENT: 0.4 %
BILIRUB SERPL-MCNC: 0.6 MG/DL (ref 0.2–0.7)
BUN BLDV-MCNC: 6 MG/DL (ref 8–23)
CALCIUM SERPL-MCNC: 7.7 MG/DL (ref 8.5–9.9)
CHLORIDE BLD-SCNC: 99 MEQ/L (ref 95–107)
CO2: 26 MEQ/L (ref 20–31)
CREAT SERPL-MCNC: 2.75 MG/DL (ref 0.5–0.9)
EOSINOPHILS ABSOLUTE: 0.2 K/UL (ref 0–0.7)
EOSINOPHILS RELATIVE PERCENT: 1.1 %
GFR AFRICAN AMERICAN: 20.5
GFR NON-AFRICAN AMERICAN: 16.9
GLOBULIN: 3.3 G/DL (ref 2.3–3.5)
GLUCOSE BLD-MCNC: 69 MG/DL (ref 70–99)
HBV SURFACE AB TITR SER: NORMAL MIU/ML
HCT VFR BLD CALC: 29.7 % (ref 37–47)
HEMOGLOBIN: 9.6 G/DL (ref 12–16)
HEPATITIS B SURFACE ANTIGEN INTERPRETATION: NORMAL
LYMPHOCYTES ABSOLUTE: 3.7 K/UL (ref 1–4.8)
LYMPHOCYTES RELATIVE PERCENT: 24.3 %
MCH RBC QN AUTO: 31.9 PG (ref 27–31.3)
MCHC RBC AUTO-ENTMCNC: 32.3 % (ref 33–37)
MCV RBC AUTO: 98.7 FL (ref 82–100)
MONOCYTES ABSOLUTE: 1 K/UL (ref 0.2–0.8)
MONOCYTES RELATIVE PERCENT: 6.7 %
NEUTROPHILS ABSOLUTE: 10.2 K/UL (ref 1.4–6.5)
NEUTROPHILS RELATIVE PERCENT: 67.5 %
PDW BLD-RTO: 17.3 % (ref 11.5–14.5)
PLATELET # BLD: 303 K/UL (ref 130–400)
POTASSIUM SERPL-SCNC: 4 MEQ/L (ref 3.4–4.9)
RBC # BLD: 3.01 M/UL (ref 4.2–5.4)
SODIUM BLD-SCNC: 132 MEQ/L (ref 135–144)
TOTAL PROTEIN: 5 G/DL (ref 6.3–8)
WBC # BLD: 15.1 K/UL (ref 4.8–10.8)

## 2021-02-05 PROCEDURE — 1210000000 HC MED SURG R&B

## 2021-02-05 PROCEDURE — 85025 COMPLETE CBC W/AUTO DIFF WBC: CPT

## 2021-02-05 PROCEDURE — 99220 PR INITIAL OBSERVATION CARE/DAY 70 MINUTES: CPT | Performed by: INTERNAL MEDICINE

## 2021-02-05 PROCEDURE — G0378 HOSPITAL OBSERVATION PER HR: HCPCS

## 2021-02-05 PROCEDURE — 86706 HEP B SURFACE ANTIBODY: CPT

## 2021-02-05 PROCEDURE — 96372 THER/PROPH/DIAG INJ SC/IM: CPT

## 2021-02-05 PROCEDURE — 6370000000 HC RX 637 (ALT 250 FOR IP): Performed by: INTERNAL MEDICINE

## 2021-02-05 PROCEDURE — 80053 COMPREHEN METABOLIC PANEL: CPT

## 2021-02-05 PROCEDURE — 86704 HEP B CORE ANTIBODY TOTAL: CPT

## 2021-02-05 PROCEDURE — 6360000002 HC RX W HCPCS: Performed by: INTERNAL MEDICINE

## 2021-02-05 PROCEDURE — 2580000003 HC RX 258: Performed by: INTERNAL MEDICINE

## 2021-02-05 PROCEDURE — 36415 COLL VENOUS BLD VENIPUNCTURE: CPT

## 2021-02-05 PROCEDURE — 71250 CT THORAX DX C-: CPT

## 2021-02-05 PROCEDURE — 87340 HEPATITIS B SURFACE AG IA: CPT

## 2021-02-05 RX ORDER — SODIUM CHLORIDE, SODIUM LACTATE, POTASSIUM CHLORIDE, CALCIUM CHLORIDE 600; 310; 30; 20 MG/100ML; MG/100ML; MG/100ML; MG/100ML
INJECTION, SOLUTION INTRAVENOUS CONTINUOUS
Status: DISCONTINUED | OUTPATIENT
Start: 2021-02-05 | End: 2021-02-11 | Stop reason: HOSPADM

## 2021-02-05 RX ADMIN — HEPARIN SODIUM 5000 UNITS: 5000 INJECTION INTRAVENOUS; SUBCUTANEOUS at 07:51

## 2021-02-05 RX ADMIN — PANTOPRAZOLE SODIUM 40 MG: 40 TABLET, DELAYED RELEASE ORAL at 07:51

## 2021-02-05 RX ADMIN — Medication 10 ML: at 07:52

## 2021-02-05 RX ADMIN — Medication 10 ML: at 20:22

## 2021-02-05 RX ADMIN — TRAMADOL HYDROCHLORIDE 50 MG: 50 TABLET, FILM COATED ORAL at 07:51

## 2021-02-05 ASSESSMENT — PAIN SCALES - GENERAL
PAINLEVEL_OUTOF10: 4
PAINLEVEL_OUTOF10: 0
PAINLEVEL_OUTOF10: 1

## 2021-02-05 NOTE — PROGRESS NOTES
Pt alert and oriented X4 calm and cooperative. Assessment complete; PERRLA, neuro assessment unremarkable, lung sounds clear, heart sounds normal, bowel sounds active in all four quadrants. Bowel sounds active in all four quadrants. Pt has +2 non-pitting edema to BUE, +2 pitting edema to RLE and +1 pitting edema to LLE. Pt has redness to her coccyx and bruising to BUE. Call light in place, bed alarm on will continue to monitor . 0000: Pt refusing heparin.  Electronically signed by Ruben Foster RN on 2/5/2021 at 1:25 AM

## 2021-02-05 NOTE — PROGRESS NOTES
Hemodialysis x 4 hours with 3K bath; tolerated well. Removed = -2000; RIJ CVC capped & locked with heparin per lumen fill volume. Tolerated well. Report called to Mary Martinez RN.

## 2021-02-05 NOTE — PLAN OF CARE
Nutrition Problem #1: Inadequate oral intake  Intervention: Food and/or Nutrient Delivery: Continue Current Diet, Continue Oral Nutrition Supplement  Nutritional Goals: Intake >50% of meals/supplement. stable weight.

## 2021-02-05 NOTE — CONSULTS
Hematology/Oncology Consult  Encounter Date: 2021 10:18 AM    Ms. Jenny Llanes is a 66 y.o. female  : 1950  MRN: [id number]  Acct Number: [de-identified]  Requesting Provider: Dr. Partha Ventura    Reason for request: lytic calvarial lesions     CONSULTANT: Jeremiah Alejandre MD    HPI: The pt has ESKD on hemodialysis. The pt developed pain in left lateral neck radiating to the left shoulder and arm. CT head showed destructive lytic lesions in the skull. . No pain over the thoracic and lumbar spine. She said she had a lung  nodule seen on CXR in 2018 but the pt did not want any evaluation of this lesion. She had 40 lb weight loss over past 6months sinc 2020. She has same chronic mild cough  from sinus drainage. No headache or dizziness. No abd pain. No gross bleeding. Pt went to the ER  because of worsening generalized weakness and was hospitalized. Patient Active Problem List   Diagnosis    Gastrointestinal hemorrhage associated with duodenal ulcer    Anemia due to acute blood loss    Chronic obstructive pulmonary disease (HCC)    CKD (chronic kidney disease) stage 3, GFR 30-59 ml/min    Essential hypertension    General weakness     Past Medical History:   Diagnosis Date    Dialysis patient [facility])     Port right side of chest for dialysis    GERD (gastroesophageal reflux disease)     Hypertension     Kidney disease      Past Surgical History:   Procedure Laterality Date    PORT SURGERY Right     chest for dialysis     No family history on file. Social History     Socioeconomic History    Marital status:       Spouse name: Not on file    Number of children: Not on file    Years of education: Not on file    Highest education level: Not on file   Occupational History    Not on file   Social Needs    Financial resource strain: Not hard at all    Food insecurity     Worry: Never true     Inability: Never true   [employer] needs     Medical: No     Non-medical: No   Tobacco Use  acetaminophen (TYLENOL) suppository 650 mg  650 mg Rectal Q6H PRN Belinda Bautista MD        heparin (porcine) injection 2,000 Units  2,000 Units Intravenous Once Roman Peguero DO        heparin (porcine) injection 4,000 Units  4,000 Units Intercatheter Once Roman ePguero DO        lidocaine 4 % external patch 1 patch  1 patch Transdermal Daily Roman Peguero DO   1 patch at 02/05/21 0751    pantoprazole (PROTONIX) tablet 40 mg  40 mg Oral QAM AC Dinh D Sedar, DO   40 mg at 02/05/21 0751    traMADol (ULTRAM) tablet 50 mg  50 mg Oral Q6H PRN Xuan Greenwood Sedar, DO   50 mg at 02/05/21 7520     No outpatient medications have been marked as taking for the 2/2/21 encounter [facility] Encounter). Allergies   Allergen Reactions    Ace Inhibitors     Benazepril     Ciprofloxacin     Shellfish Allergy     Sulfa Antibiotics          ROS:  Unremarkable except for symptoms mentioned in HPI. PHYSICAL EXAMINATION:   VITAL SIGNS: /68   Pulse 101   Temp 98.5 °F (36.9 °C)   Resp 18   Ht 5' 3\" (1.6 m)   Wt 135 lb 9.3 oz (61.5 kg)   SpO2 96%   BMI 24.02 kg/m²       GENERAL: In no acute distress, well- nourished, well- developed, alert and oriented to person place and time. SKIN: Warm and dry, without jaundice, ecchymoses, or petechiae. HEENT: Normocephalic, sclera anicteric, oral mucosa moist without lesion or exudate in the visible oral cavity or oropharynx,  No epistaxis  NECK: no JVD ; no thyromegaly; limitation of ROM of neck when turning to the left : + mild tenderness lateral to lower cervical spine  NODES: No palpable adenopathy in the neck Levels I-V, bilateral supraclavicular fossae, axillary chains, or inguinal regions. LUNGS: Good inspiratory effort, no accessory muscle use, clear bilaterally, no focal wheeze, rales or rhonchi. CARDIAC: Normal HS; Regular rate and rhythm,   ABDOMINAL: soft, non-tender, no mass or organomegaly. MUSKL: + tenderness over left side of lower cervical spine; + dialysis catheter on right upper chest wall  EXTREMITIES: trace bipedal edema; no calf tenderness  NEUROLOGIC: Gait not tested. No grossly apparent focal deficits.   PSYCH: cooperative; pt has appropriate behavior and mood    LAB RESULTS:  Recent Results (from the past 24 hour(s))   Free Light Chains, Quantitative, Urine    Collection Time: 02/04/21  7:18 PM   Result Value Ref Range    Hours Collected random hr    Urine Total Volume random mL   CBC Auto Differential    Collection Time: 02/05/21  5:31 AM   Result Value Ref Range    WBC 15.1 (H) 4.8 - 10.8 K/uL    RBC 3.01 (L) 4.20 - 5.40 M/uL    Hemoglobin 9.6 (L) 12.0 - 16.0 g/dL    Hematocrit 29.7 (L) 37.0 - 47.0 %    MCV 98.7 82.0 - 100.0 fL    MCH 31.9 (H) 27.0 - 31.3 pg    MCHC 32.3 (L) 33.0 - 37.0 %    RDW 17.3 (H) 11.5 - 14.5 %    Platelets 610 553 - 458 K/uL    Neutrophils % 67.5 %    Lymphocytes % 24.3 %    Monocytes % 6.7 %    Eosinophils % 1.1 %    Basophils % 0.4 %    Neutrophils Absolute 10.2 (H) 1.4 - 6.5 K/uL    Lymphocytes Absolute 3.7 1.0 - 4.8 K/uL    Monocytes Absolute 1.0 (H) 0.2 - 0.8 K/uL    Eosinophils Absolute 0.2 0.0 - 0.7 K/uL    Basophils Absolute 0.1 0.0 - 0.2 K/uL   Comprehensive Metabolic Panel    Collection Time: 02/05/21  5:31 AM   Result Value Ref Range    Sodium 132 (L) 135 - 144 mEq/L    Potassium 4.0 3.4 - 4.9 mEq/L    Chloride 99 95 - 107 mEq/L    CO2 26 20 - 31 mEq/L    Anion Gap 7 (L) 9 - 15 mEq/L    Glucose 69 (L) 70 - 99 mg/dL    BUN 6 (L) 8 - 23 mg/dL    CREATININE 2.75 (H) 0.50 - 0.90 mg/dL    GFR Non-African American 16.9 (L) >60    GFR  20.5 (L) >60    Calcium 7.7 (L) 8.5 - 9.9 mg/dL    Total Protein 5.0 (L) 6.3 - 8.0 g/dL    Albumin 1.7 (L) 3.5 - 4.6 g/dL    Total Bilirubin 0.6 0.2 - 0.7 mg/dL    Alkaline Phosphatase 210 (H) 40 - 130 U/L    ALT 47 (H) 0 - 33 U/L    AST 88 (H) 0 - 35 U/L    Globulin 3.3 2.3 - 3.5 g/dL     Recent Labs     02/02/21 1731 02/02/21  1731 02/05/21  0531   COLORU Yellow  --   --    PHUR 7.5  --   --    WBCUA 0-2  --   --    RBCUA 0-2  --   --    BACTERIA RARE*  --   --    CLARITYU Clear  --   --    SPECGRAV 1.015  --   --    LEUKOCYTESUR Negative  --   --    UROBILINOGEN 1.0  --   --    BILIRUBINUR Negative  --   --    BLOODU Negative  --   --    GLUCOSE  --    < > 69*    < > = values in this interval not displayed. RADIOLOGY RESULTS:  Xr Hip Left (2-3 Views)    Result Date: 2/3/2021  EXAMINATION: XR HIP LEFT (2-3 VIEWS), XR SHOULDER LEFT (MIN 2 VIEWS)                  CLINICAL HISTORY:   pain cancer lytic lesions in skull  COMPARISONS: None. FINDINGS: Four views of the left shoulder are submitted. There is diffuse generalized osteopenia. No acute fractures. No dislocations. No focal bony abnormalities                                                                                   NO ACUTE FRACTURES CONSIDER WHOLE BODY BONE SCAN TO FURTHER EVALUATE EXAMINATION: RIGHT HIP  CLINICAL HISTORY: Pain. COMPARISON: None  FINDINGS: Two views are  submitted. No acute fractures. No dislocations. No focal bony abnormalities                                                                               IMPRESSION: NO ACUTE FRACTURE.  CONSIDER WHOLE BODY BONE SCAN TO FURTHER EVALUATE    Ct Head Wo Contrast    Result Date: 2/2/2021 CT HEAD WO CONTRAST : 2/2/2021 CLINICAL HISTORY:  weakness . COMPARISON: None available. TECHNIQUE: Spiral unenhanced images were obtained of the head, with routine multiplanar reconstructions performed. All CT scans at this facility use dose modulation, iterative reconstruction, and/or weight based dosing when appropriate to reduce radiation dose to as low as reasonably achievable. FINDINGS: Destructive lytic lesions with surrounding soft tissue density are present within the posterior right parietal bone, at the vertex, the left lateral orbital wall, and to a lesser extent elsewhere, which are suspicious for metastatic disease. The largest within the right posterior parietal calvarium measures approximately 2.5 cm. There is no intracranial hemorrhage, mass effect, midline shift, extra-axial collection, evidence of hydrocephalus, recent ischemic infarct, or displaced pathologic fracture identified. Mild generalized cerebral volume loss is present, with mild patchy supratentorial white matter changes most consistent with chronic small vessel ischemic disease. The mastoid air cells and visualized paranasal sinuses are essentially clear. SUSPICIOUS LYTIC CALVARIAL LESIONS FOR METASTATIC DISEASE, AS NOTED. OTHER POSSIBILITIES ARE THOUGHT TO BE LESS LIKELY. FURTHER EVALUATION IS SUGGESTED. NO ACUTE INTRACRANIAL HEMORRHAGE OR COMPLICATION IDENTIFIED. 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 FINDINGS: Four views of the left shoulder are submitted. There is diffuse generalized osteopenia. No acute fractures. No dislocations.  No focal bony abnormalities NO ACUTE FRACTURES CONSIDER WHOLE BODY BONE SCAN TO FURTHER EVALUATE EXAMINATION: RIGHT HIP  CLINICAL HISTORY: Pain. COMPARISON: None  FINDINGS: Two views are  submitted. No acute fractures. No dislocations. No focal bony abnormalities                                                                               IMPRESSION: NO ACUTE FRACTURE. CONSIDER WHOLE BODY BONE SCAN TO FURTHER EVALUATE    Xr Chest Portable    Result Date: 2/2/2021  Exam: XR CHEST PORTABLE History:  shoulder pain weakness Technique: AP portable view of the chest obtained. Comparison: None Chest x-ray portable Findings: There is a right internal jugular dialysis catheter in place . The cardiomediastinal silhouette is within normal limits. There are no infiltrates, consolidations or effusions. There is mild blunting of the left costophrenic recesses which may represent a small pleural effusion. Bones of the thorax appear intact. No acute cardiopulmonary changes. Mild blunting of the costophrenic recess on the left may be due to a small effusion versus prior scarring. ASSESSMENT AND PLAN  1. Multiple calvarial lytic lesions suspicious for malignancy. r/o bone mets , r/o multiple myeloma. I discussed with the pt about my concern about possible malignancy. I recommended additional  Imaging studies including CT chest today and referral to interventional radiologist for biopsy to establish tissue diagnosis of malignancy. Possible PET scan or bone scan can be done as out -pt. Plan for treatment including chemotx and possible radiation tx will follow after biopsy result is available. 2. ESKD on hemodialysis. Thank you ,Dr. Miguel Carrion, for this consultation.       Electronically signed by Seamus Serrano MD on 2/5/2021 at 10:18 AM

## 2021-02-05 NOTE — PROGRESS NOTES
Comprehensive Nutrition Assessment    Type and Reason for Visit:  Reassess    Nutrition Recommendations/Plan: Continue Current Diet, Continue Oral Nutrition Supplement    Nutrition Assessment:  Pt remains at nutritional risk due to reported decreased appetite/intake, with weight loss. To continue to monitor adequacy of intake/continue nutritional supplement with meals. Malnutrition Assessment:  Malnutrition Status: At risk for malnutrition (Comment)    Context:  Chronic Illness       Estimated Daily Nutrient Needs:  Energy (kcal):  3273-9718(kg x 25-27); Weight Used for Energy Requirements:  Admission     Protein (g):  61-73 (kg x 1.0-1.2); Weight Used for Protein Requirements:  Admission        Fluid (ml/day):  ~1600; Method Used for Fluid Requirements:  1 ml/kcal      Nutrition Related Findings:  PMH-CKD-HD, COPD, htn, duodenal ulcer. Pt reported decreased appetite/intake, loss of taste, and weight loss over the last ~6 months. +1-2 BLE, +2 BUE, and +1 gen edema noted. Last BM noted 2/3.       Wounds:  None       Current Nutrition Therapies:    DIET GENERAL; No Added Salt (3-4 GM)  Dietary Nutrition Supplements: Clear Liquid Oral Supplement x1/day  Dietary Nutrition Supplements: Frozen Oral Supplement bid    Anthropometric Measures:  · Height: 5' 3\" (160 cm)  · Current Body Weight: 135 lb 9.3 oz (61.5 kg)   · Admission Body Weight: 134 lb (60.8 kg)(?source)    · Usual Body Weight: 149 lb (67.6 kg)((5/16/20 ?source); 174lb (7/2020,pt stated))     · Ideal Body Weight: 115 lbs; % Ideal Body Weight   >100%  · BMI: 24  · BMI Categories: Normal Weight (BMI 22.0 to 24.9) age over 72       Nutrition Diagnosis:   · Inadequate oral intake related to altered taste perception(c/o poor appetite) as evidenced by intake 0-25%, weight loss    Nutrition Interventions:   Food and/or Nutrient Delivery:  Continue Current Diet, Continue Oral Nutrition Supplement  Nutrition Education/Counseling:  No recommendation at this time Coordination of Nutrition Care:  Continue to monitor while inpatient    Goals:  Intake >50% of meals/supplement. stable weight.        Nutrition Monitoring and Evaluation:   Food/Nutrient Intake Outcomes:  Food and Nutrient Intake, Supplement Intake  Physical Signs/Symptoms Outcomes:  Weight, Biochemical Data, Fluid Status or Edema     Electronically signed by Jennie Hunt RD, LD on 2/5/21 at 3:52 PM EST

## 2021-02-05 NOTE — PROGRESS NOTES
Physical Therapy Missed Treatment   Facility/Department: [facility] MED SURG [id number]/U142-01    NAME: Tony Gant    : 1950 (66 y.o.)  MRN: [id number]    Account: [de-identified]  Gender: female    Chart reviewed, attempted PT at 1. Patient unavailable 2° to:    [] Hold per nsg request    [x] Pt declined pt too fatigued post dialysis, agreeable to participate tomorrow. Encouragement and education given, pt still unwilling. [x] Nsg notified   [] Other notified    [] Pt. . off floor for test/procedure. [] Pt. Unavailable       Will attempt PT treatment again at earliest convenience.       Electronically signed by Seltenerden Storkwitz, PTA on 21 at 3:42 PM EST

## 2021-02-05 NOTE — CONSULTS
ABGs: No results for input(s): PHART, XRI1HWM, PO2ART, ZLQ5DGR, BEART, Y3WEEXMU, AUZ6DLE in the last 72 hours. O2 Device: None (Room air)  No results found for: [address]    Radiology  I personally reviewed imaging studies and the upper lobe lung mass, with mediastinal lymphadenopathy, and bilateral pleural effusion larger on the right        Assessment, plan:   Patient is at risk due to    · Lung mass, with lytic bone lesions, and mediastinal lymphadenopathy. This all indicate malignancy. Patient is reluctant to consider any sort of treatment. She would like to proceed with tissue diagnosis  · Acute kidney injury, likely dehydration  · Leukocytosis, likely reactive    Recommendation  · Better proceed with diagnosing and staging procedure which best will be biopsy from the lytic bone lesions.   Also can consider thoracentesis and cytology on the fluid if those are nondiagnostic we can proceed with more aggressive intervention and bronchoscopy with endobronchial ultrasound  · Will obtain bone scan  · Check procalcitonin  · Gentle hydration  · Physical therapy  · O2 to keep sat 88 to 90%     DW Dr Edrie Habermann     Thank you for consultation    Electronically signed by Liam Mooney MD, [facility],  on 2/5/2021 at 3:23 PM

## 2021-02-05 NOTE — PROGRESS NOTES
Renal Progress Note    Assessment and Plan:    67 yo lady with ESRD on HD MWF. Admitted with weakness. Some leukocytosis. Working on d/c to SNF    - from renal standpoint, HD MWF  - review [facility] and outpt records for her meds etc      Patient Active Problem List:     Gastrointestinal hemorrhage associated with duodenal ulcer     Anemia due to acute blood loss     Chronic obstructive pulmonary disease (HCC)     CKD (chronic kidney disease) stage 3, GFR 30-59 ml/min     Essential hypertension     General weakness      Subjective:   Admit Date: 2/2/2021    Interval History: pt seen on hd. No hd related complications. 3k bath. Has permcath. For fistula later in month      Medications:   Scheduled Meds:   sodium chloride flush  10 mL Intravenous 2 times per day    heparin (porcine)  5,000 Units Subcutaneous 3 times per day    heparin (porcine)  2,000 Units Intravenous Once    heparin (porcine)  4,000 Units Intercatheter Once    lidocaine  1 patch Transdermal Daily    pantoprazole  40 mg Oral QAM AC     Continuous Infusions:    CBC:   Recent Labs     02/04/21  0521 02/05/21  0531   WBC 12.6* 15.1*   HGB 9.2* 9.6*    303     CMP:    Recent Labs     02/03/21  0515 02/04/21  0528 02/05/21  0531    136 132*   K 3.4 4.0 4.0    102 99   CO2 26 28 26   BUN 12 5* 6*   CREATININE 4.06* 1.97* 2.75*   GLUCOSE 81 75 69*   CALCIUM 7.5* 7.3* 7.7*   LABGLOM 10.8* 24.9* 16.9*     Troponin:   Recent Labs     02/03/21  0515   TROPONINI 0.019*     BNP: No results for input(s): BNP in the last 72 hours. INR: No results for input(s): INR in the last 72 hours. Lipids:   Recent Labs     02/02/21  1442   LIPASE 9*     Liver:   Recent Labs     02/05/21  0531   AST 88*   ALT 47*   ALKPHOS 210*   PROT 5.0*   LABALBU 1.7*   BILITOT 0.6     Iron:  No results for input(s): IRONS, FERRITIN in the last 72 hours. Invalid input(s): LABIRONS  Urinalysis: No results for input(s): UA in the last 72 hours.     Objective: Vitals: /75   Pulse 102   Temp 98.5 °F (36.9 °C)   Resp 18   Ht 5' 3\" (1.6 m)   Wt 135 lb 9.3 oz (61.5 kg)   SpO2 96%   BMI 24.02 kg/m²    Wt Readings from Last 3 Encounters:   02/05/21 135 lb 9.3 oz (61.5 kg)   02/02/21 134 lb (60.8 kg)   01/11/21 135 lb (61.2 kg)      24HR INTAKE/OUTPUT:  No intake or output data in the 24 hours ending 02/05/21 1104    Constitutional:  Alert, awake, no apparent distress   Skin:normal, no rash  HEENT:sclera anicteric.   Head atraumatic normocephalic  Neck:supple with no thyromegally  Cardiovascular:  S1, S2 without m/r/g   Respiratory:  CTA B without w/r/r   Abdomen: +bs, soft, nt  Ext: no LE edema  Musculoskeletal:Intact  Neuro:Alert and oriented with no deficit      Electronically signed by Giovanni Covarrubias MD on 2/5/2021 at 11:04 AM

## 2021-02-06 ENCOUNTER — APPOINTMENT (OUTPATIENT)
Dept: CT IMAGING | Age: 71
DRG: 299 | End: 2021-02-06
Attending: INTERNAL MEDICINE
Payer: MEDICARE

## 2021-02-06 LAB
ALBUMIN SERPL-MCNC: 1.5 G/DL (ref 3.5–4.6)
ALBUMIN SERPL-MCNC: 1.73 G/DL (ref 3.75–5.01)
ALP BLD-CCNC: 212 U/L (ref 40–130)
ALPHA-1-GLOBULIN: 0.43 G/DL (ref 0.19–0.46)
ALPHA-2-GLOBULIN: 0.46 G/DL (ref 0.48–1.05)
ALT SERPL-CCNC: 32 U/L (ref 0–33)
ANION GAP SERPL CALCULATED.3IONS-SCNC: 6 MEQ/L (ref 9–15)
AST SERPL-CCNC: 43 U/L (ref 0–35)
BASOPHILS ABSOLUTE: 0.1 K/UL (ref 0–0.2)
BASOPHILS RELATIVE PERCENT: 1.1 %
BETA GLOBULIN: 0.57 G/DL (ref 0.48–1.1)
BILIRUB SERPL-MCNC: 0.7 MG/DL (ref 0.2–0.7)
BUN BLDV-MCNC: 4 MG/DL (ref 8–23)
CALCIUM SERPL-MCNC: 7.7 MG/DL (ref 8.5–9.9)
CHLORIDE BLD-SCNC: 103 MEQ/L (ref 95–107)
CO2: 29 MEQ/L (ref 20–31)
CREAT SERPL-MCNC: 2.02 MG/DL (ref 0.5–0.9)
EOSINOPHILS ABSOLUTE: 0.1 K/UL (ref 0–0.7)
EOSINOPHILS RELATIVE PERCENT: 0.9 %
GAMMA GLOBULIN: 1.32 G/DL (ref 0.62–1.51)
GFR AFRICAN AMERICAN: 29.2
GFR NON-AFRICAN AMERICAN: 24.1
GLOBULIN: 2.9 G/DL (ref 2.3–3.5)
GLUCOSE BLD-MCNC: 75 MG/DL (ref 70–99)
HCT VFR BLD CALC: 28.5 % (ref 37–47)
HEMOGLOBIN: 8.9 G/DL (ref 12–16)
IMMUNOFIXATION REFLEX: ABNORMAL
LYMPHOCYTES ABSOLUTE: 4.2 K/UL (ref 1–4.8)
LYMPHOCYTES RELATIVE PERCENT: 29.5 %
MCH RBC QN AUTO: 31.5 PG (ref 27–31.3)
MCHC RBC AUTO-ENTMCNC: 31.2 % (ref 33–37)
MCV RBC AUTO: 100.9 FL (ref 82–100)
MONOCYTES ABSOLUTE: 1.2 K/UL (ref 0.2–0.8)
MONOCYTES RELATIVE PERCENT: 8.1 %
NEUTROPHILS ABSOLUTE: 8.6 K/UL (ref 1.4–6.5)
NEUTROPHILS RELATIVE PERCENT: 60.4 %
PDW BLD-RTO: 17.6 % (ref 11.5–14.5)
PHOSPHORUS: 2.5 MG/DL (ref 2.3–4.8)
PLATELET # BLD: 276 K/UL (ref 130–400)
POTASSIUM SERPL-SCNC: 3.8 MEQ/L (ref 3.4–4.9)
RBC # BLD: 2.82 M/UL (ref 4.2–5.4)
SODIUM BLD-SCNC: 138 MEQ/L (ref 135–144)
SPE/IFE INTERPRETATION: ABNORMAL
TOTAL PROTEIN: 4.4 G/DL (ref 6.3–8)
TOTAL PROTEIN: 4.5 G/DL (ref 6.3–8.2)
WBC # BLD: 14.2 K/UL (ref 4.8–10.8)

## 2021-02-06 PROCEDURE — 6360000002 HC RX W HCPCS: Performed by: INTERNAL MEDICINE

## 2021-02-06 PROCEDURE — G0378 HOSPITAL OBSERVATION PER HR: HCPCS

## 2021-02-06 PROCEDURE — 2580000003 HC RX 258: Performed by: INTERNAL MEDICINE

## 2021-02-06 PROCEDURE — 1210000000 HC MED SURG R&B

## 2021-02-06 PROCEDURE — 6370000000 HC RX 637 (ALT 250 FOR IP): Performed by: INTERNAL MEDICINE

## 2021-02-06 PROCEDURE — 80053 COMPREHEN METABOLIC PANEL: CPT

## 2021-02-06 PROCEDURE — 99225 PR SBSQ OBSERVATION CARE/DAY 25 MINUTES: CPT | Performed by: INTERNAL MEDICINE

## 2021-02-06 PROCEDURE — 96372 THER/PROPH/DIAG INJ SC/IM: CPT

## 2021-02-06 PROCEDURE — 97116 GAIT TRAINING THERAPY: CPT

## 2021-02-06 PROCEDURE — 74176 CT ABD & PELVIS W/O CONTRAST: CPT

## 2021-02-06 PROCEDURE — 84100 ASSAY OF PHOSPHORUS: CPT

## 2021-02-06 PROCEDURE — 85025 COMPLETE CBC W/AUTO DIFF WBC: CPT

## 2021-02-06 PROCEDURE — 36415 COLL VENOUS BLD VENIPUNCTURE: CPT

## 2021-02-06 RX ADMIN — Medication 10 ML: at 08:08

## 2021-02-06 RX ADMIN — HEPARIN SODIUM 5000 UNITS: 5000 INJECTION INTRAVENOUS; SUBCUTANEOUS at 08:08

## 2021-02-06 RX ADMIN — HEPARIN SODIUM 5000 UNITS: 5000 INJECTION INTRAVENOUS; SUBCUTANEOUS at 16:13

## 2021-02-06 RX ADMIN — TRAMADOL HYDROCHLORIDE 50 MG: 50 TABLET, FILM COATED ORAL at 10:51

## 2021-02-06 RX ADMIN — HEPARIN SODIUM 5000 UNITS: 5000 INJECTION INTRAVENOUS; SUBCUTANEOUS at 00:53

## 2021-02-06 RX ADMIN — PANTOPRAZOLE SODIUM 40 MG: 40 TABLET, DELAYED RELEASE ORAL at 08:11

## 2021-02-06 RX ADMIN — Medication 10 ML: at 21:10

## 2021-02-06 ASSESSMENT — PAIN DESCRIPTION - LOCATION: LOCATION: SHOULDER

## 2021-02-06 ASSESSMENT — PAIN SCALES - GENERAL: PAINLEVEL_OUTOF10: 7

## 2021-02-06 NOTE — PROGRESS NOTES
INPATIENT PROGRESS NOTES    PATIENT NAME: Gorge Liu  MRN: [id number]  SERVICE DATE:  2021   SERVICE TIME:  11:23 AM      PRIMARY SERVICE: Pulmonary Disease    CHIEF COMPLAINTS: Lung mass    INTERVAL HPI: Patient seen and examined at bedside, Interval Notes, orders reviewed. Nursing notes noted    Patient report feeling good, no wheezing, no coughing, no chest pain, no nausea no vomiting    Review of system:     GI Abdominal pain No  Skin Rash No    Social History     Tobacco Use    Smoking status: Former Smoker     Quit date: 2020     Years since quittin.5    Smokeless tobacco: Never Used   Substance Use Topics    Alcohol use: Never     Frequency: Never     No family history on file. OBJECTIVE    Body mass index is 24.02 kg/m². PHYSICAL EXAM:  Vitals:  BP (!) 105/55   Pulse 96   Temp 97.8 °F (36.6 °C) (Oral)   Resp 18   Ht 5' 3\" (1.6 m)   Wt 135 lb 9.3 oz (61.5 kg)   SpO2 94%   BMI 24.02 kg/m²     General: alert, cooperative, no distress  Head: normocephalic, atraumatic  Eyes:No gross abnormalities. ENT:  MMM no lesions  Neck:  supple and no masses  Chest : clear to auscultation bilaterally- no wheezes, rales or rhonchi, normal air movement, no respiratory distress  Heart[de-identified] Heart sounds are normal.  Regular rate and rhythm without murmur, gallop or rub. ABD:  symmetric, soft, non-tender, no guarding or rebound  Musculoskeletal : no cyanosis, no clubbing and no edema  Neuro:  Grossly normal  Skin: No rashes or nodules noted.   Lymph node:  no cervical nodes  Urology: No Malin   Psychiatric: appropriate    DATA:   Recent Labs     21  0531 21  0516   WBC 15.1* 14.2*   HGB 9.6* 8.9*   HCT 29.7* 28.5*   MCV 98.7 100.9*    276     Recent Labs     21  0531 21  0516   * 138   K 4.0 3.8   CL 99 103   CO2 26 29   BUN 6* 4*   CREATININE 2.75* 2.02*   GLUCOSE 69* 75   CALCIUM 7.7* 7.7*   PROT 5.0* 4.4*   LABALBU 1.7* 1.5*   BILITOT 0.6 0.7 CT of the Abdomen and Pelvis without intravenous contrast medium History:  Abnormal CT Technical Factors: CT imaging of the abdomen and pelvis were obtained and formatted as 5 mm contiguous axial images from the domes of the diaphragm to the symphysis pubis. Sagittal and coronal reconstructions were also obtained. Oral contrast medium: Water as oral contrast medium. Intravenous contrast medium: None. Comparison: Findings: Lungs:  Bilateral pleural effusions and consolidation/atelectasis are seen in both bases. Liver:  Normal in size, shape, and attenuation. Bile Ducts:  Normal in caliber. Gallbladder: Distended with stones layering in the gallbladder. Pancreas: Homogeneous parenchymal enhancement. No necrosis identified. No intrahepatic fluid collections, cystic or solid lesions, pancreatic ductal dilatation, or calcification. Spleen:  Normal in size without masses or calcifications. No splenules. Kidneys:  Small with cortical thinning. There is no evidence for hydronephrosis or renal calculus Adrenals:  Normal. Small bowel:  Normal in caliber. Appendix:  Normal. Colon: Diverticulosis coli without evidence for diverticulitis. Peritoneum:  Small to moderate amount of free fluid in the posterior pelvis. Surgical clips are seen in the upper abdomen. Vessels: At the level of the renal veins the abdominal aorta measures 3.5 cm in greatest transverse diameter and 3 cm anterior posterior. Below the renal veins the abdominal aorta measures 5.5 transverse by 5.6 cm AP. Right above the bifurcation the aneurysm measures 3.9 transverse by 3.8 cm. The aneurysm measures 13 cm craniocaudad. There are diffuse atherosclerotic calcifications visualized. Lymph nodes: No retroperitoneal lymph node enlargement. Bladder: Distended normally Abdominal Wall: Edematous change is in the subcutaneous fat over the flanks. A tiny umbilical hernia. Bones: No destructive bony lesions are seen. 1. Large abdominal aortic aneurysm is seen as described. 2. No evidence for bowel obstruction, diverticulitis or appendicitis. There is evidence of diverticulosis coli. Also free fluid is seen in the posterior pelvis 3. Cholelithiasis 4. Small to moderate bilateral pleural effusions and bilateral infiltrates versus atelectasis All CT scans at this facility use dose modulation, iterative reconstruction, and/or weight based dosing when appropriate to reduce radiation dose to as low as reasonably achievable. 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 FINDINGS: Four views of the left shoulder are submitted. There is diffuse generalized osteopenia. No acute fractures. No dislocations. No focal bony abnormalities                                                                                   NO ACUTE FRACTURES CONSIDER WHOLE BODY BONE SCAN TO FURTHER EVALUATE EXAMINATION: RIGHT HIP  CLINICAL HISTORY: Pain. COMPARISON: None  FINDINGS: Two views are  submitted. No acute fractures. No dislocations. No focal bony abnormalities                                                                               IMPRESSION: NO ACUTE FRACTURE.  CONSIDER WHOLE BODY BONE SCAN TO FURTHER EVALUATE    Ct Head Wo Contrast    Result Date: 2/2/2021 CT of the Chest without intravenous contrast medium. History:  Lung nodule. Follow-up. Nodule identified, in 2018. Also, history of multiple lytic bone lesions in skull and cervical spine. End-stage renal disease. Receiving hemodialysis. Technical Factors: CT imaging of the chest was obtained and formatted as 5 mm contiguous axial images from the thoracic inlet through the adrenal glands. Sagittal and coronal reconstruction obtained during postprocessing. Intravenous contrast medium:  None Comparison:  None Findings: On the right side, a noncalcified nonpleural-based, partially stellate shaped 2.2 x 2.1 x 2.4 cm mass is visualized within the right upper lobe (series 2, image 23, series 601, image 25, series 602, image 21). Strand-like extension from the mass extending to the pleural surface. Dependent subsegmental atelectatic change versus fluid, dependent portion right middle lobe at level of major fissure. Small to moderate right pleural effusion. Masslike consolidation extending from right infrahilar region, caudally into the right lower lobe, with calcification identified. 6 mm calcification identified within right lower lobe. On the left side, small to moderate left pleural effusion with consolidation, left lower lobe. No nodules and no masses. Cardiac size normal. No pericardial effusion. Thoracic aorta normal in course and caliber. Multilumen jugular vein central line extending into right ventricle. Limited imaging upper abdomen shows 2 radiopacities measuring up to 2.7 mm just anterior to distal greater curvature (series 2, image 59). No osteoblastic, and no osteolytic lesions. 2.2 x 2.1 x 2.4 cm right upper lobe mass. Malignancy diagnosis of exclusion. Bilateral small to moderate pleural effusions with bilateral lower lobe atelectasis/pneumonia. Other findings discussed. All CT scans at this facility use dose modulation, iterative reconstruction, and/or weight based dosing when appropriate to reduce radiation dose to as low as reasonably achievable. 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 FINDINGS: Four views of the left shoulder are submitted. There is diffuse generalized osteopenia. No acute fractures. No dislocations. No focal bony abnormalities                                                                                   NO ACUTE FRACTURES CONSIDER WHOLE BODY BONE SCAN TO FURTHER EVALUATE EXAMINATION: RIGHT HIP  CLINICAL HISTORY: Pain. COMPARISON: None  FINDINGS: Two views are  submitted. No acute fractures. No dislocations. No focal bony abnormalities                                                                               IMPRESSION: NO ACUTE FRACTURE. CONSIDER WHOLE BODY BONE SCAN TO FURTHER EVALUATE    Xr Chest Portable    Result Date: 2/2/2021  Exam: XR CHEST PORTABLE History:  shoulder pain weakness Technique: AP portable view of the chest obtained. Comparison: None Chest x-ray portable Findings: There is a right internal jugular dialysis catheter in place . The cardiomediastinal silhouette is within normal limits. There are no infiltrates, consolidations or effusions. There is mild blunting of the left costophrenic recesses which may represent a small pleural effusion. Bones of the thorax appear intact. No acute cardiopulmonary changes. Mild blunting of the costophrenic recess on the left may be due to a small effusion versus prior scarring. CT abdomen reviewed by me shows large aortic abdominal aneurysm, bilateral pleural effusion      IMPRESSION AND SUGGESTION:  Patient is at risk due to   · Lung mass, malignant until proven otherwise, patient does not wish to proceed with any treatment plan however she would like diagnosis.   · Large abdominal aortic aneurysm  · Chronic kidney disease    Recommendation  · Bone scan on Monday, if metastatic lesion seen we will proceed with bone biopsy  · If not can consider EBUS with transbronchial needle aspiration, and bronchoscopy with transbronchial biopsy  · Abdominal aortic aneurysm management per primary team    DW Dr Celeste Wynn       Electronically signed by Jennifer Arora MD,  FCCP   on 2/6/2021 at 11:23 AM

## 2021-02-06 NOTE — PROGRESS NOTES
Physical Therapy Med Surg Daily Treatment Note  Facility/Department: [address] [place] [address] [address]  Room: [place] [address]       NAME: Penny Dubois  : 1950 (14 y.o.)  MRN: [id number]  CODE STATUS: Full Code    Date of Service: 2021    Patient Diagnosis(es): General weakness [R53.1]   No chief complaint on file. Patient Active Problem List    Diagnosis Date Noted    General weakness 2021    Gastrointestinal hemorrhage associated with duodenal ulcer 2020    Anemia due to acute blood loss 2020    Chronic obstructive pulmonary disease ([facility] [address] 2020    CKD (chronic kidney disease) stage 3, GFR 30-59 ml/min 2020    Essential hypertension 2020        Past Medical History:   Diagnosis Date    [facility] patient [facility])     Port right side of chest for dialysis    GERD (gastroesophageal reflux disease)     Hypertension     Kidney disease      Past Surgical History:   Procedure Laterality Date    PORT SURGERY Right     chest for dialysis       Restrictions  Restrictions/Precautions: Fall Risk(high rincon score)    SUBJECTIVE   General  Chart Reviewed: Yes  Family / Caregiver Present: No  Subjective  Subjective: \"There is someone else coming in here every 15 minutes. \"    Pre-Session Pain Report  Pre Treatment Pain Screening  Pain at present: 7  Scale Used: Numeric Score  Intervention List: Patient able to continue with treatment  Pain Screening  Patient Currently in Pain: Yes       Post-Session Pain Report  Pain Assessment  Pain Assessment: 0-10  Pain Level: 7  Pain Type: Chronic pain  Pain Location: Shoulder  Pain Orientation: Left         OBJECTIVE        Bed mobility  Supine to Sit: Supervision  Sit to Supine: Supervision  Comment: SPV for safety. Transfers  Sit to Stand: Stand by assistance  Stand to sit: Stand by assistance  Comment: WW, 's for hand placement and safety.     Ambulation  Ambulation?: Yes  Ambulation 1  Surface: level tile  Device: Rolling Walker Assistance: Stand by assistance  Quality of Gait: Forward flexed, Decreased step height and stride. Gait Deviations: Slow Ledy;Decreased step length;Decreased step height  Distance: 30'  Comments: Moderate fatigue with ambulation Declined second attempt. Activity Tolerance  Activity Tolerance: Patient limited by fatigue          ASSESSMENT   Assessment: pt fatigues with ambulation this date, pt reports being \"worried\" about going home at [id number] [facility]. Discharge Recommendations:  Continue to assess pending progress    Goals  Long term goals  Long term goal 1: Pt will demonstrate bed mobility indep to allow pt to get into and out of bed safely. Long term goal 2: Pt will demonstrate transfers mod indep with WW to allow pt to return home alone. Long term goal 3: Pt will demonstrate amb 100ft mod indep with WW to allow pt to amb inside her home with safety. Patient Goals   Patient goals : \"return home\"    PLAN    Times per week: 3-6  Safety Devices  Type of devices: Bed alarm in place, Call light within reach, Left in bed     AMPAC (6 CLICK) BASIC MOBILITY  AM-PAC Inpatient Mobility Raw Score : 17      Therapy Time   Individual   Time In 1058   Time Out 1108   Minutes 10      Gait: 9  BM/Trsf: [address], PTA, 02/06/21 at 11:18 AM         Definitions for assistance levels  Independent = pt does not require any physical supervision or assistance from another person for activity completion. Device may be needed.   Stand by assistance = pt requires verbal cues or instructions from another person, close to but not touching, to perform the activity  Minimal assistance= pt performs 75% or more of the activity; assistance is required to complete the activity  Moderate assistance= pt performs 50% of the activity; assistance is required to complete the activity  Maximal assistance = pt performs 25% of the activity; assistance is required to complete the activity Dependent = pt requires total physical assistance to accomplish the task

## 2021-02-06 NOTE — PROGRESS NOTES
Hospitalist Daily Progress Note  Name: Miranda Peters  Age: 66 y.o. Gender: female  CodeStatus: Full Code  Allergies: Ace Inhibitors  Benazepril  Ciprofloxacin  Shellfish Allergy  Sulfa Antibiotics    Chief Complaint:No chief complaint on file. Primary Care Provider: Craig Ash MD  InpatientTreatment Team: Treatment Team: Attending Provider: Shahab Palafox DO; Consulting Physician: Toyin Dwyer MD; Consulting Physician: My Stewart MD; Utilization Reviewer: Laura Jones RN; Consulting Physician: Alexandru Roy MD; Consulting Physician: Gallo Velazquez MD; Registered Nurse: Tavo Rust RN; Patient Care Tech: Karen Corcoran  Admission Date: 2/2/2021      Subjective: Patient is seen and evaluated at bedside during HD. CT chest shows a 2cm pulmonary mass. There is also noted vertebral lesion. Case discussed with pulmonary after oncology consultation, who is recommending peripheral pathologic tissue diagnosis for both staging and diagnosis. imaging reviewed with interventional radiology who does not appreciate any peripherally amenable lesions for biopsy. Otherwise vitals remains stable she is afebrile. She is agreeable to a tissue diagnosis, and any procedures required to obtain one. Physical Exam  Constitutional:       Appearance: Normal appearance. She is obese. She is not ill-appearing or diaphoretic. HENT:      Head: Normocephalic and atraumatic. Nose: Nose normal.      Mouth/Throat:      Mouth: Mucous membranes are moist.      Pharynx: Oropharynx is clear. Eyes:      Conjunctiva/sclera: Conjunctivae normal.      Pupils: Pupils are equal, round, and reactive to light. Cardiovascular:      Rate and Rhythm: Normal rate. Heart sounds: Murmur present. No friction rub. No gallop. Pulmonary:      Breath sounds: No wheezing, rhonchi or rales. Abdominal:      General: There is no distension. Tenderness: There is no abdominal tenderness. There is no guarding. Musculoskeletal: Normal range of motion. Right lower leg: No edema. Left lower leg: No edema. Neurological:      General: No focal deficit present. Mental Status: She is alert and oriented to person, place, and time. Psychiatric:         Mood and Affect: Mood normal.         Thought Content: Thought content normal.         Judgment: Judgment normal.         Review of Systems  14 point ROS is reviewed negative except for as above  Medications:  Reviewed    Infusion Medications:    lactated ringers Stopped (02/05/21 2021)     Scheduled Medications:    sodium chloride flush  10 mL Intravenous 2 times per day    heparin (porcine)  5,000 Units Subcutaneous 3 times per day    heparin (porcine)  2,000 Units Intravenous Once    heparin (porcine)  4,000 Units Intercatheter Once    lidocaine  1 patch Transdermal Daily    pantoprazole  40 mg Oral QAM AC     PRN Meds: sodium chloride flush, promethazine **OR** ondansetron, polyethylene glycol, acetaminophen **OR** acetaminophen, traMADol    Labs:   Recent Labs     02/03/21 0515 02/04/21 0521 02/05/21  0531   WBC 12.6* 12.6* 15.1*   HGB 8.7* 9.2* 9.6*   HCT 26.7* 29.5* 29.7*    307 303     Recent Labs     02/03/21  0515 02/04/21  0528 02/05/21  0531    136 132*   K 3.4 4.0 4.0    102 99   CO2 26 28 26   BUN 12 5* 6*   CREATININE 4.06* 1.97* 2.75*   CALCIUM 7.5* 7.3* 7.7*     Recent Labs     02/03/21  0515 02/04/21  0528 02/05/21  0531   AST 67* 155* 88*   ALT 22 45* 47*   BILITOT 0.5 0.5 0.6   ALKPHOS 164* 186* 210*     No results for input(s): INR in the last 72 hours.   Recent Labs     02/03/21  0049 02/03/21  0515   TROPONINI 0.021* 0.019*       Urinalysis:   Lab Results   Component Value Date    NITRU Negative 02/02/2021    WBCUA 0-2 02/02/2021    WBCUA 1 09/10/2020    BACTERIA RARE 02/02/2021    RBCUA 0-2 02/02/2021    RBCUA 1 09/10/2020    BLOODU Negative 02/02/2021    SPECGRAV 1.015 02/02/2021    GLUCOSEU Negative 02/02/2021 Radiology:   Most recent    Chest CT      WITH CONTRAST:No results found for this or any previous visit. WITHOUT CONTRAST: No results found for this or any previous visit. CXR      2-view: No results found for this or any previous visit. Portable:   Results for orders placed during the hospital encounter of 02/02/21   XR CHEST PORTABLE    Narrative Exam: XR CHEST PORTABLE    History:  shoulder pain weakness     Technique: AP portable view of the chest obtained. Comparison: None    Chest x-ray portable   Findings: There is a right internal jugular dialysis catheter in place . The cardiomediastinal silhouette is within normal limits. There are no infiltrates, consolidations or effusions. There is mild blunting of the left costophrenic recesses which may represent a small pleural effusion. Bones of the thorax appear intact. Impression No acute cardiopulmonary changes. Mild blunting of the costophrenic recess on the left may be due to a small effusion versus prior scarring. Echo No results found for this or any previous visit. Assessment/Plan:    Active Hospital Problems    Diagnosis Date Noted    General weakness [R53.1] 02/03/2021     Generalized weakness with cranial lytic lesions: CT chest shows a likely primary 2cm pulmonary mass. CT abd/pelvis with bone scan ordered by pulmonary to evaluate for distal mass for biopsy. Case discussed with IR. Follow CT abd and discuss possible biopsy following imaging. ESRD on hemodialysis: Nephrology following. Continue HD as scheduled. GERD: Protonix  DVT prophylaxis Heparin    Additional work up or/and treatment plan may be added today or then after based on clinical progression. I am managing a portion of pt care. Some medical issues are handled byother specialists. Additional work up and treatment should be done in out pt setting by pt PCP and other out pt providers. In addition to examining and evaluating pt, I spent additional time explaining care, normaland abnormal findings, and treatment plan. All of pt questions were answered. Counseling, diet and education were provided. Case will be discussed with nursing staff when appropriate. Family will be updated if and whenappropriate.       Electronically signed by Delio Oakes DO on 2/5/2021 at 8:23 PM

## 2021-02-07 PROBLEM — R91.1 NODULE OF RIGHT LUNG: Status: ACTIVE | Noted: 2021-02-07

## 2021-02-07 PROBLEM — N18.6 ESRD (END STAGE RENAL DISEASE) ON DIALYSIS (HCC): Status: ACTIVE | Noted: 2021-02-07

## 2021-02-07 PROBLEM — G89.29 CHRONIC BILATERAL LOW BACK PAIN WITHOUT SCIATICA: Status: ACTIVE | Noted: 2021-02-07

## 2021-02-07 PROBLEM — M89.9 LYTIC LESION OF BONE ON X-RAY: Status: ACTIVE | Noted: 2021-02-07

## 2021-02-07 PROBLEM — M54.2 NECK PAIN: Status: ACTIVE | Noted: 2021-02-07

## 2021-02-07 PROBLEM — M54.50 CHRONIC BILATERAL LOW BACK PAIN WITHOUT SCIATICA: Status: ACTIVE | Noted: 2021-02-07

## 2021-02-07 PROBLEM — M25.512 ACUTE PAIN OF LEFT SHOULDER: Status: ACTIVE | Noted: 2021-02-07

## 2021-02-07 PROBLEM — M79.10 MUSCLE TENDERNESS: Status: ACTIVE | Noted: 2021-02-07

## 2021-02-07 PROBLEM — S43.402A SPRAIN OF LEFT SHOULDER: Status: ACTIVE | Noted: 2021-02-07

## 2021-02-07 PROBLEM — R91.8 MASS OF UPPER LOBE OF RIGHT LUNG: Status: ACTIVE | Noted: 2021-02-07

## 2021-02-07 PROBLEM — Z99.2 ESRD (END STAGE RENAL DISEASE) ON DIALYSIS (HCC): Status: ACTIVE | Noted: 2021-02-07

## 2021-02-07 PROBLEM — R26.9 ABNORMALITY OF GAIT: Status: ACTIVE | Noted: 2021-02-07

## 2021-02-07 LAB
ALBUMIN SERPL-MCNC: 1.6 G/DL (ref 3.5–4.6)
ALP BLD-CCNC: 240 U/L (ref 40–130)
ALT SERPL-CCNC: 24 U/L (ref 0–33)
ANION GAP SERPL CALCULATED.3IONS-SCNC: 9 MEQ/L (ref 9–15)
AST SERPL-CCNC: 32 U/L (ref 0–35)
BASOPHILS ABSOLUTE: 0.1 K/UL (ref 0–0.2)
BASOPHILS RELATIVE PERCENT: 0.6 %
BILIRUB SERPL-MCNC: 0.7 MG/DL (ref 0.2–0.7)
BUN BLDV-MCNC: 6 MG/DL (ref 8–23)
CALCIUM SERPL-MCNC: 7.9 MG/DL (ref 8.5–9.9)
CHLORIDE BLD-SCNC: 99 MEQ/L (ref 95–107)
CO2: 28 MEQ/L (ref 20–31)
CREAT SERPL-MCNC: 2.92 MG/DL (ref 0.5–0.9)
EOSINOPHILS ABSOLUTE: 0.2 K/UL (ref 0–0.7)
EOSINOPHILS RELATIVE PERCENT: 1.3 %
GFR AFRICAN AMERICAN: 19.1
GFR NON-AFRICAN AMERICAN: 15.8
GLOBULIN: 3.1 G/DL (ref 2.3–3.5)
GLUCOSE BLD-MCNC: 71 MG/DL (ref 70–99)
HCT VFR BLD CALC: 31 % (ref 37–47)
HEMOGLOBIN: 9.9 G/DL (ref 12–16)
HEPATITIS B CORE TOTAL ANTIBODY: NEGATIVE
HOURS COLLECTED: ABNORMAL HR
KAPPA QUANT FREE LIGHT CHAINS: 701.65 MG/L (ref 0–32.9)
LAMBDA FREE LIGHT CHAINS URINE/ VOL: 115.42 MG/L (ref 0–3.79)
LYMPHOCYTES ABSOLUTE: 3.8 K/UL (ref 1–4.8)
LYMPHOCYTES RELATIVE PERCENT: 27.8 %
MCH RBC QN AUTO: 31.8 PG (ref 27–31.3)
MCHC RBC AUTO-ENTMCNC: 31.9 % (ref 33–37)
MCV RBC AUTO: 99.9 FL (ref 82–100)
MONOCYTES ABSOLUTE: 1.1 K/UL (ref 0.2–0.8)
MONOCYTES RELATIVE PERCENT: 7.7 %
NEUTROPHILS ABSOLUTE: 8.6 K/UL (ref 1.4–6.5)
NEUTROPHILS RELATIVE PERCENT: 62.6 %
PDW BLD-RTO: 17.4 % (ref 11.5–14.5)
PHOSPHORUS: 3.2 MG/DL (ref 2.3–4.8)
PLATELET # BLD: 286 K/UL (ref 130–400)
POTASSIUM SERPL-SCNC: 3.9 MEQ/L (ref 3.4–4.9)
PROTEIN PROTEIN: ABNORMAL MG/D
RBC # BLD: 3.1 M/UL (ref 4.2–5.4)
SODIUM BLD-SCNC: 136 MEQ/L (ref 135–144)
TOTAL PROTEIN: 4.7 G/DL (ref 6.3–8)
URINE FREE KAPPA EXCRETION/DAY: ABNORMAL MG/D
URINE FREE LAMBDA EXCRETION/DAY: ABNORMAL MG/D
URINE TOTAL VOLUME: ABNORMAL ML
WBC # BLD: 13.7 K/UL (ref 4.8–10.8)

## 2021-02-07 PROCEDURE — 84100 ASSAY OF PHOSPHORUS: CPT

## 2021-02-07 PROCEDURE — 99225 PR SBSQ OBSERVATION CARE/DAY 25 MINUTES: CPT | Performed by: INTERNAL MEDICINE

## 2021-02-07 PROCEDURE — G0378 HOSPITAL OBSERVATION PER HR: HCPCS

## 2021-02-07 PROCEDURE — 80053 COMPREHEN METABOLIC PANEL: CPT

## 2021-02-07 PROCEDURE — 6360000002 HC RX W HCPCS: Performed by: INTERNAL MEDICINE

## 2021-02-07 PROCEDURE — 85025 COMPLETE CBC W/AUTO DIFF WBC: CPT

## 2021-02-07 PROCEDURE — 6370000000 HC RX 637 (ALT 250 FOR IP): Performed by: INTERNAL MEDICINE

## 2021-02-07 PROCEDURE — 96372 THER/PROPH/DIAG INJ SC/IM: CPT

## 2021-02-07 PROCEDURE — 96374 THER/PROPH/DIAG INJ IV PUSH: CPT

## 2021-02-07 PROCEDURE — 1210000000 HC MED SURG R&B

## 2021-02-07 PROCEDURE — 36415 COLL VENOUS BLD VENIPUNCTURE: CPT

## 2021-02-07 PROCEDURE — 6360000002 HC RX W HCPCS: Performed by: ANESTHESIOLOGY

## 2021-02-07 PROCEDURE — 2580000003 HC RX 258: Performed by: INTERNAL MEDICINE

## 2021-02-07 RX ORDER — METAXALONE 800 MG/1
400 TABLET ORAL 3 TIMES DAILY PRN
Status: DISCONTINUED | OUTPATIENT
Start: 2021-02-07 | End: 2021-02-11 | Stop reason: HOSPADM

## 2021-02-07 RX ORDER — LIDOCAINE 4 G/G
3 PATCH TOPICAL DAILY
Qty: 90 PATCH | Refills: 0 | DISCHARGE
Start: 2021-02-08

## 2021-02-07 RX ORDER — DEXAMETHASONE SODIUM PHOSPHATE 4 MG/ML
4 INJECTION, SOLUTION INTRA-ARTICULAR; INTRALESIONAL; INTRAMUSCULAR; INTRAVENOUS; SOFT TISSUE EVERY 12 HOURS
Status: COMPLETED | OUTPATIENT
Start: 2021-02-07 | End: 2021-02-08

## 2021-02-07 RX ORDER — TRAMADOL HYDROCHLORIDE 50 MG/1
50 TABLET ORAL EVERY 6 HOURS PRN
Qty: 12 TABLET | Refills: 0 | Status: SHIPPED | OUTPATIENT
Start: 2021-02-07 | End: 2021-02-10

## 2021-02-07 RX ORDER — LIDOCAINE 4 G/G
3 PATCH TOPICAL DAILY
Status: DISCONTINUED | OUTPATIENT
Start: 2021-02-08 | End: 2021-02-11 | Stop reason: HOSPADM

## 2021-02-07 RX ORDER — METAXALONE 400 MG/1
400 TABLET ORAL 3 TIMES DAILY PRN
Qty: 30 TABLET | Refills: 0 | DISCHARGE
Start: 2021-02-07 | End: 2021-02-17

## 2021-02-07 RX ADMIN — HEPARIN SODIUM 5000 UNITS: 5000 INJECTION INTRAVENOUS; SUBCUTANEOUS at 20:37

## 2021-02-07 RX ADMIN — Medication 10 ML: at 09:22

## 2021-02-07 RX ADMIN — HEPARIN SODIUM 5000 UNITS: 5000 INJECTION INTRAVENOUS; SUBCUTANEOUS at 05:09

## 2021-02-07 RX ADMIN — TRAMADOL HYDROCHLORIDE 50 MG: 50 TABLET, FILM COATED ORAL at 14:24

## 2021-02-07 RX ADMIN — HEPARIN SODIUM 5000 UNITS: 5000 INJECTION INTRAVENOUS; SUBCUTANEOUS at 14:17

## 2021-02-07 RX ADMIN — DEXAMETHASONE SODIUM PHOSPHATE 4 MG: 4 INJECTION, SOLUTION INTRAMUSCULAR; INTRAVENOUS at 15:08

## 2021-02-07 RX ADMIN — Medication 10 ML: at 20:37

## 2021-02-07 RX ADMIN — PANTOPRAZOLE SODIUM 40 MG: 40 TABLET, DELAYED RELEASE ORAL at 05:09

## 2021-02-07 ASSESSMENT — ENCOUNTER SYMPTOMS
BACK PAIN: 1
EYES NEGATIVE: 1
WHEEZING: 0
SHORTNESS OF BREATH: 1
ALLERGIC/IMMUNOLOGIC NEGATIVE: 1
GASTROINTESTINAL NEGATIVE: 1
CHEST TIGHTNESS: 0
CHOKING: 0
STRIDOR: 0
APNEA: 0
COUGH: 0

## 2021-02-07 ASSESSMENT — PAIN SCALES - GENERAL: PAINLEVEL_OUTOF10: 8

## 2021-02-07 NOTE — CONSULTS
INITIAL CONSULT -PAIN MANAGEMENT     SERVICE DATE:  2/7/2021   SERVICE TIME:  12:03 PM  Admission date 2/2/2021/observation  REASON FORCONSULT: Left shoulder pain, generalized neck and back pain, lytic lesion found on the skull bone  REQUESTING PHYSICIAN:  Dr Fritz Curtis MD     Chief complaint: Left shoulder pain, generalized neck and back pain    HISTORY OF PRESENTILLNESS:  Ms. Davina uJares is a 66 y.o. female who presents for [facility] after been admitted due to generalized weakness and not feeling well. Patient has several hospitalization over the past 6 months, majorly due to GI issue and peptic ulcer disease and bleeding. As she came to the hospital she has generalized weakness, needed for her CAT scan of the chest right upper lobe lung mass, this patient states been smoking but quit 7 months ago, but almost 50 years smoking history. She was not aware about any mass in the lung, denies any difficulty breathing more than her normal shortness of breath she suffered from prolonged walking or activities. She has end-stage renal disease on dialysis, patient was helped from generalized weakness, decided to come to the ER. She has pain in the neck, chronic neck pain with some radiating shoulder pain. However she stated she was lifting heavy basket 2 weeks ago and she suffered from severe shoulder pain on the left side which is new to her. Concerned about the lytic lesion found in the skull, the right upper lobe lung mass, highly suspicious for lung cancer with bone metastasis, she had x-ray did not show any bone mets in the shoulder however she was ordered a bone scan throughout the whole body to evaluate. Reviewed all imaging noted as below. She stated that the best pain relief she has was given with tramadol.     PAIN  ASSESSMENT:    none, waxing and waning, moderate    aching, sharp, stabbing and throbbing    pain is perceived as severe (6-8 pain scale) PAST MEDICAL HISTORY:    Past Medical History:   Diagnosis Date    Dialysis patient [facility])     Port right side of chest for dialysis    GERD (gastroesophageal reflux disease)     Hypertension     Kidney disease      PAST SURGICAL HISTORY:    Past Surgical History:   Procedure Laterality Date    PORT SURGERY Right     chest for dialysis     FAMILY HISTORY:  No family history on file. SOCIALHISTORY:    Social History     Socioeconomic History    Marital status:       Spouse name: Not on file    Number of children: Not on file    Years of education: Not on file    Highest education level: Not on file   Occupational History    Not on file   Social Needs    Financial resource strain: Not hard at all    Food insecurity     Worry: Never true     Inability: Never true   [employer] needs     Medical: No     Non-medical: No   Tobacco Use    Smoking status: Former Smoker     Quit date: 2020     Years since quittin.5    Smokeless tobacco: Never Used   Substance and Sexual Activity    Alcohol use: Never     Frequency: Never    Drug use: Never    Sexual activity: Not on file   Lifestyle    Physical activity     Days per week: Not on file     Minutes per session: Not on file    Stress: Not on file   Relationships    Social connections     Talks on phone: Not on file     Gets together: Not on file     Attends Denominational service: Not on file     Active member of club or organization: Not on file     Attends meetings of clubs or organizations: Not on file     Relationship status: Not on file    Intimate partner violence     Fear of current or ex partner: Not on file     Emotionally abused: Not on file     Physically abused: Not on file     Forced sexual activity: Not on file   Other Topics Concern    Not on file   Social History Narrative    Not on file     PSYCHOLOGICAL HISTORY: No history of anxiety or depression    MEDICATIONS: Medications Prior to Admission: metoprolol succinate (TOPROL XL) 25 MG extended release tablet, Take 1 tablet by mouth daily  omeprazole (PRILOSEC) 20 MG delayed release capsule, Take 1 capsule by mouth daily  Multiple Vitamins-Minerals (RENAPLEX-D) TABS, TAKE 1 TABLET BY MOUTH EVERY DAY AS DIRECTED  [unfilled]    ALLERGIES:  Ace inhibitors, Benazepril, Ciprofloxacin, Shellfish allergy, and Sulfa antibiotics    COMPLETE REVIEW OF SYSTEMS:  As noted in HPI, 12 point ROS reviewed and otherwise negative. Review of Systems   Constitutional: Positive for activity change, appetite change, fatigue and unexpected weight change. Negative for chills, diaphoresis and fever. HENT: Negative. Eyes: Negative. Respiratory: Positive for shortness of breath. Negative for apnea, cough, choking, chest tightness, wheezing and stridor. Cardiovascular: Negative. Gastrointestinal: Negative. Endocrine: Negative. Genitourinary: Negative. Musculoskeletal: Positive for back pain, gait problem, myalgias, neck pain and neck stiffness. Negative for arthralgias and joint swelling. Skin: Negative. Allergic/Immunologic: Negative. Neurological: Positive for weakness (Generalized weakness). Negative for dizziness, tremors, seizures, syncope, facial asymmetry, speech difficulty, light-headedness, numbness and headaches. Psychiatric/Behavioral: Positive for dysphoric mood. Negative for agitation, behavioral problems, confusion, decreased concentration, hallucinations, self-injury, sleep disturbance and suicidal ideas. The patient is not nervous/anxious and is not hyperactive. OBJECTIVE  PHYSICAL EXAM:  /65   Pulse 98   Temp 97.3 °F (36.3 °C) (Oral)   Resp 18   Ht 5' 3\" (1.6 m)   Wt 135 lb 9.3 oz (61.5 kg)   SpO2 96%   BMI 24.02 kg/m²   Body mass index is 24.02 kg/m². CT of the Abdomen and Pelvis without intravenous contrast medium History:  Abnormal CT Technical Factors: CT imaging of the abdomen and pelvis were obtained and formatted as 5 mm contiguous axial images from the domes of the diaphragm to the symphysis pubis. Sagittal and coronal reconstructions were also obtained. Oral contrast medium: Water as oral contrast medium. Intravenous contrast medium: None. Comparison: Findings: Lungs:  Bilateral pleural effusions and consolidation/atelectasis are seen in both bases. Liver:  Normal in size, shape, and attenuation. Bile Ducts:  Normal in caliber. Gallbladder: Distended with stones layering in the gallbladder. Pancreas: Homogeneous parenchymal enhancement. No necrosis identified. No intrahepatic fluid collections, cystic or solid lesions, pancreatic ductal dilatation, or calcification. Spleen:  Normal in size without masses or calcifications. No splenules. Kidneys:  Small with cortical thinning. There is no evidence for hydronephrosis or renal calculus Adrenals:  Normal. Small bowel:  Normal in caliber. Appendix:  Normal. Colon: Diverticulosis coli without evidence for diverticulitis. Peritoneum:  Small to moderate amount of free fluid in the posterior pelvis. Surgical clips are seen in the upper abdomen. Vessels: At the level of the renal veins the abdominal aorta measures 3.5 cm in greatest transverse diameter and 3 cm anterior posterior. Below the renal veins the abdominal aorta measures 5.5 transverse by 5.6 cm AP. Right above the bifurcation the aneurysm measures 3.9 transverse by 3.8 cm. The aneurysm measures 13 cm craniocaudad. There are diffuse atherosclerotic calcifications visualized. Lymph nodes: No retroperitoneal lymph node enlargement. Bladder: Distended normally Abdominal Wall: Edematous change is in the subcutaneous fat over the flanks. A tiny umbilical hernia. Bones: No destructive bony lesions are seen. 1. Large abdominal aortic aneurysm is seen as described. 2. No evidence for bowel obstruction, diverticulitis or appendicitis. There is evidence of diverticulosis coli. Also free fluid is seen in the posterior pelvis 3. Cholelithiasis 4. Small to moderate bilateral pleural effusions and bilateral infiltrates versus atelectasis All CT scans at this facility use dose modulation, iterative reconstruction, and/or weight based dosing when appropriate to reduce radiation dose to as low as reasonably achievable. 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 FINDINGS: Four views of the left shoulder are submitted. There is diffuse generalized osteopenia. No acute fractures. No dislocations. No focal bony abnormalities                                                                                   NO ACUTE FRACTURES CONSIDER WHOLE BODY BONE SCAN TO FURTHER EVALUATE EXAMINATION: RIGHT HIP  CLINICAL HISTORY: Pain. COMPARISON: None  FINDINGS: Two views are  submitted. No acute fractures. No dislocations. No focal bony abnormalities                                                                               IMPRESSION: NO ACUTE FRACTURE.  CONSIDER WHOLE BODY BONE SCAN TO FURTHER EVALUATE    Ct Head Wo Contrast    Result Date: 2/2/2021 CT HEAD WO CONTRAST : 2/2/2021 CLINICAL HISTORY:  weakness . COMPARISON: None available. TECHNIQUE: Spiral unenhanced images were obtained of the head, with routine multiplanar reconstructions performed. All CT scans at this facility use dose modulation, iterative reconstruction, and/or weight based dosing when appropriate to reduce radiation dose to as low as reasonably achievable. FINDINGS: Destructive lytic lesions with surrounding soft tissue density are present within the posterior right parietal bone, at the vertex, the left lateral orbital wall, and to a lesser extent elsewhere, which are suspicious for metastatic disease. The largest within the right posterior parietal calvarium measures approximately 2.5 cm. There is no intracranial hemorrhage, mass effect, midline shift, extra-axial collection, evidence of hydrocephalus, recent ischemic infarct, or displaced pathologic fracture identified. Mild generalized cerebral volume loss is present, with mild patchy supratentorial white matter changes most consistent with chronic small vessel ischemic disease. The mastoid air cells and visualized paranasal sinuses are essentially clear. SUSPICIOUS LYTIC CALVARIAL LESIONS FOR METASTATIC DISEASE, AS NOTED. OTHER POSSIBILITIES ARE THOUGHT TO BE LESS LIKELY. FURTHER EVALUATION IS SUGGESTED. NO ACUTE INTRACRANIAL HEMORRHAGE OR COMPLICATION IDENTIFIED.      Ct Chest Wo Contrast    Result Date: 2/5/2021 CT of the Chest without intravenous contrast medium. History:  Lung nodule. Follow-up. Nodule identified, in 2018. Also, history of multiple lytic bone lesions in skull and cervical spine. End-stage renal disease. Receiving hemodialysis. Technical Factors: CT imaging of the chest was obtained and formatted as 5 mm contiguous axial images from the thoracic inlet through the adrenal glands. Sagittal and coronal reconstruction obtained during postprocessing. Intravenous contrast medium:  None Comparison:  None Findings: On the right side, a noncalcified nonpleural-based, partially stellate shaped 2.2 x 2.1 x 2.4 cm mass is visualized within the right upper lobe (series 2, image 23, series 601, image 25, series 602, image 21). Strand-like extension from the mass extending to the pleural surface. Dependent subsegmental atelectatic change versus fluid, dependent portion right middle lobe at level of major fissure. Small to moderate right pleural effusion. Masslike consolidation extending from right infrahilar region, caudally into the right lower lobe, with calcification identified. 6 mm calcification identified within right lower lobe. On the left side, small to moderate left pleural effusion with consolidation, left lower lobe. No nodules and no masses. Cardiac size normal. No pericardial effusion. Thoracic aorta normal in course and caliber. Multilumen jugular vein central line extending into right ventricle. Limited imaging upper abdomen shows 2 radiopacities measuring up to 2.7 mm just anterior to distal greater curvature (series 2, image 59). No osteoblastic, and no osteolytic lesions. 2.2 x 2.1 x 2.4 cm right upper lobe mass. Malignancy diagnosis of exclusion. Bilateral small to moderate pleural effusions with bilateral lower lobe atelectasis/pneumonia. Other findings discussed. All CT scans at this facility use dose modulation, iterative reconstruction, and/or weight based dosing when appropriate to reduce radiation dose to as low as reasonably achievable. 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 FINDINGS: Four views of the left shoulder are submitted. There is diffuse generalized osteopenia. No acute fractures. No dislocations. No focal bony abnormalities                                                                                   NO ACUTE FRACTURES CONSIDER WHOLE BODY BONE SCAN TO FURTHER EVALUATE EXAMINATION: RIGHT HIP  CLINICAL HISTORY: Pain. COMPARISON: None  FINDINGS: Two views are  submitted. No acute fractures. No dislocations. No focal bony abnormalities                                                                               IMPRESSION: NO ACUTE FRACTURE. CONSIDER WHOLE BODY BONE SCAN TO FURTHER EVALUATE    Xr Chest Portable    Result Date: 2/2/2021  Exam: XR CHEST PORTABLE History:  shoulder pain weakness Technique: AP portable view of the chest obtained. Comparison: None Chest x-ray portable Findings: There is a right internal jugular dialysis catheter in place . The cardiomediastinal silhouette is within normal limits. There are no infiltrates, consolidations or effusions. There is mild blunting of the left costophrenic recesses which may represent a small pleural effusion. Bones of the thorax appear intact. No acute cardiopulmonary changes. Mild blunting of the costophrenic recess on the left may be due to a small effusion versus prior scarring.          ASSESSMENT & PLAN Active Hospital Problems    Diagnosis Date Noted    Acute pain of left shoulder [M25.512] 02/07/2021    Sprain of left shoulder [S43.402A] 02/07/2021    Neck pain [M54.2] 02/07/2021    Chronic bilateral low back pain without sciatica [M54.5, G89.29] 02/07/2021    Abnormality of gait [R26.9] 02/07/2021    [facility] [N18.6, Z99.2] 02/07/2021    Muscle tenderness [M79.10] 02/07/2021    Lytic lesion of bone on x-ray [M89.9] 02/07/2021    Mass of upper lobe of right lung [R91.8] 02/07/2021    General weakness [R53.1] 02/03/2021     66years old female, who initially came due to generalized weakness, chronic end-stage renal disease on dialysis, she has chronically worsened shortness of breath, CAT scan of the chest found having right upper lobe mass that she was not aware about it also CAT scan of the head showed lytic bone lesion, probably related bone metastasis due to high concern right upper lobe mass for lung cancer with bone mets. Patient reported left shoulder pain and back pain however imaging not identifying any bony lesion however I do agree with having whole-body bone scan for better assessment given this new findings. Patient reported to me that the shoulder pain on the left side started after she was lifting heavy object which also could be a sprain given that the clinical exam is not showing any bony tenderness or limitation of range of motion and mostly muscular tenderness across the rotator cuff region.     RECOMMENDATION:  SEE ORDERS    Until able to get generalized bone scan, will manage the left shoulder pain as simple sprain, will provide some short course muscle relaxant, short course steroids and we cannot give her any nonsteroidal anti-inflammatory, topical analgesia, short course of Ultram for pain    Pending results from bone scan I do see discharge instruction for [facility] as patient having been difficulty with functioning lately which could be related to the kidney disease but also other contributing factors related to the new findings as above, also pending results from bone scan to identify if she has any other concerning lesions across the spinal region however she has no neurological red flags on exam.    SIGNATURE: Marii Washington MD PATIENT NAME: Chin Johnson   DATE: February 7, 2021 MRN: [id number]   TIME: 12:03 PM PAGER/CONTACT #: [phone]

## 2021-02-07 NOTE — PROGRESS NOTES
Neurological:      General: No focal deficit present. Mental Status: She is alert and oriented to person, place, and time. Psychiatric:         Mood and Affect: Mood normal.         Thought Content: Thought content normal.         Judgment: Judgment normal.         Review of Systems  14 point ROS is reviewed negative except for as above  Medications:  Reviewed    Infusion Medications:    lactated ringers Stopped (02/05/21 2021)     Scheduled Medications:    [START ON 2/8/2021] lidocaine  3 patch Transdermal Daily    dexamethasone  4 mg Intravenous Q12H    sodium chloride flush  10 mL Intravenous 2 times per day    heparin (porcine)  5,000 Units Subcutaneous 3 times per day    heparin (porcine)  2,000 Units Intravenous Once    heparin (porcine)  4,000 Units Intercatheter Once    pantoprazole  40 mg Oral QAM AC     PRN Meds: metaxalone, sodium chloride flush, promethazine **OR** ondansetron, polyethylene glycol, acetaminophen **OR** [DISCONTINUED] acetaminophen, traMADol    Labs:   Recent Labs     02/05/21 0531 02/06/21 0516 02/07/21  0530   WBC 15.1* 14.2* 13.7*   HGB 9.6* 8.9* 9.9*   HCT 29.7* 28.5* 31.0*    276 286     Recent Labs     02/05/21  0531 02/06/21  0516 02/07/21  0530   * 138 136   K 4.0 3.8 3.9   CL 99 103 99   CO2 26 29 28   BUN 6* 4* 6*   CREATININE 2.75* 2.02* 2.92*   CALCIUM 7.7* 7.7* 7.9*   PHOS  --  2.5 3.2     Recent Labs     02/05/21 0531 02/06/21  0516 02/07/21  0530   AST 88* 43* 32   ALT 47* 32 24   BILITOT 0.6 0.7 0.7   ALKPHOS 210* 212* 240*     No results for input(s): INR in the last 72 hours. No results for input(s): [place] in the last 72 hours.     Urinalysis:   Lab Results   Component Value Date    NITRU Negative 02/02/2021    WBCUA 0-2 02/02/2021    WBCUA 1 09/10/2020    BACTERIA RARE 02/02/2021    RBCUA 0-2 02/02/2021    RBCUA 1 09/10/2020    BLOODU Negative 02/02/2021    SPECGRAV 1.015 02/02/2021    GLUCOSEU Negative 02/02/2021 Radiology:   Most recent    Chest CT      WITH CONTRAST:No results found for this or any previous visit. WITHOUT CONTRAST: No results found for this or any previous visit. CXR      2-view: No results found for this or any previous visit. Portable:   Results for orders placed during the hospital encounter of 02/02/21   XR CHEST PORTABLE    Narrative Exam: XR CHEST PORTABLE    History:  shoulder pain weakness     Technique: AP portable view of the chest obtained. Comparison: None    Chest x-ray portable   Findings: There is a right internal jugular dialysis catheter in place . The cardiomediastinal silhouette is within normal limits. There are no infiltrates, consolidations or effusions. There is mild blunting of the left costophrenic recesses which may represent a small pleural effusion. Bones of the thorax appear intact. Impression No acute cardiopulmonary changes. Mild blunting of the costophrenic recess on the left may be due to a small effusion versus prior scarring. Echo No results found for this or any previous visit. Assessment/Plan:    Active Hospital Problems    Diagnosis Date Noted    Acute pain of left shoulder [M25.512] 02/07/2021    Sprain of left shoulder [S43.402A] 02/07/2021    Neck pain [M54.2] 02/07/2021    Chronic bilateral low back pain without sciatica [M54.5, G89.29] 02/07/2021    Abnormality of gait [R26.9] 02/07/2021    ESRD (end stage renal disease) [facility] [N18.6, Z99.2] 02/07/2021    Muscle tenderness [M79.10] 02/07/2021    Lytic lesion of bone on x-ray [M89.9] 02/07/2021    Mass of upper lobe of right lung [R91.8] 02/07/2021    General weakness [R53.1] 02/03/2021     Generalized weakness with cranial lytic lesions: CT chest shows a likely primary 2cm pulmonary mass. Bone scan Monday.   We will biopsy if we can find a good peripheral lesion rather than EBUS Large AAA: encompassing most of the abdominal cavity. Adamantly refusing vascular surgery evaluation she is aware of verbalizes and understands the risks    ESRD on hemodialysis: Nephrology following. Continue HD as scheduled. GERD: Protonix  DVT prophylaxis Heparin    Additional work up or/and treatment plan may be added today or then after based on clinical progression. I am managing a portion of pt care. Some medical issues are handled byother specialists. Additional work up and treatment should be done in out pt setting by pt PCP and other out pt providers. In addition to examining and evaluating pt, I spent additional time explaining care, normaland abnormal findings, and treatment plan. All of pt questions were answered. Counseling, diet and education were provided. Case will be discussed with nursing staff when appropriate. Family will be updated if and whenappropriate.       Electronically signed by Josafat Graham DO on 2/7/2021 at 5:10 PM

## 2021-02-07 NOTE — CARE COORDINATION
PLAN DISCUSSED WITH DR. Trey Helms, PT REMAINS OBSERVATION STATUS. PLAN FOR BONE SCAN Monday. CM TO FOLLOW FOR NEEDS.

## 2021-02-07 NOTE — PROGRESS NOTES
INPATIENT PROGRESS NOTES    PATIENT NAME: Judge Simons  MRN: [id number]  SERVICE DATE:  2021   SERVICE TIME:  12:04 PM      PRIMARY SERVICE: Pulmonary Disease    CHIEF COMPLAINTS: Lung mass    INTERVAL HPI: Patient seen and examined at bedside, Interval Notes, orders reviewed. Nursing notes noted    Feels good, no chest pain, no shortness of breath, no fever, no nausea no vomiting, slept well. Review of system:     GI Abdominal pain No  Skin Rash No    Social History     Tobacco Use    Smoking status: Former Smoker     Quit date: 2020     Years since quittin.5    Smokeless tobacco: Never Used   Substance Use Topics    Alcohol use: Never     Frequency: Never     No family history on file. OBJECTIVE    Body mass index is 24.02 kg/m². PHYSICAL EXAM:  Vitals:  /65   Pulse 98   Temp 97.3 °F (36.3 °C) (Oral)   Resp 18   Ht 5' 3\" (1.6 m)   Wt 135 lb 9.3 oz (61.5 kg)   SpO2 96%   BMI 24.02 kg/m²     General: Alert, no distress, cooperative  Head: normocephalic, atraumatic  Eyes:No gross abnormalities. ENT:  MMM no lesions  Neck:  supple and no masses  Chest : Good air movement, no wheezing, no rales, nontender, tympanic  Heart[de-identified] Heart sounds are normal.  Regular rate and rhythm without murmur, gallop or rub. ABD:  symmetric, soft, non-tender, no guarding or rebound  Musculoskeletal : no cyanosis, no clubbing and no edema  Neuro:  Grossly normal  Skin: No rashes or nodules noted.   Lymph node:  no cervical nodes  Urology: No Malin   Psychiatric: Calm    DATA:   Recent Labs     21  0516 21  0530   WBC 14.2* 13.7*   HGB 8.9* 9.9*   HCT 28.5* 31.0*   .9* 99.9    286     Recent Labs     21  0516 21  0530    136   K 3.8 3.9    99   CO2 29 28   BUN 4* 6*   CREATININE 2.02* 2.92*   GLUCOSE 75 71   CALCIUM 7.7* 7.9*   PROT 4.4* 4.7*   LABALBU 1.5* 1.6*   BILITOT 0.7 0.7   ALKPHOS 212* 240*   AST 43* 32   ALT 32 24 LABGLOM 24.1* 15.8*   GFRAA 29.2* 19.1*   GLOB 2.9 3.1       MV Settings:          No results for input(s): PHART, OAH4BTJ, PO2ART, FRB3SKY, BEART, N4LZDHZI in the last 72 hours.     O2 Device: None (Room air)    DIET GENERAL; No Added Salt (3-4 GM)  Dietary Nutrition Supplements: Clear Liquid Oral Supplement     MEDICATIONS during current hospitalization:    Continuous Infusions:   lactated ringers Stopped (02/05/21 2021)       Scheduled Meds:   sodium chloride flush  10 mL Intravenous 2 times per day    heparin (porcine)  5,000 Units Subcutaneous 3 times per day    heparin (porcine)  2,000 Units Intravenous Once    heparin (porcine)  4,000 Units Intercatheter Once    lidocaine  1 patch Transdermal Daily    pantoprazole  40 mg Oral QAM AC       PRN Meds:sodium chloride flush, promethazine **OR** ondansetron, polyethylene glycol, acetaminophen **OR** acetaminophen, traMADol    Radiology  Ct Abdomen Pelvis Wo Contrast Additional Contrast? None    Result Date: 2/6/2021 CT of the Abdomen and Pelvis without intravenous contrast medium History:  Abnormal CT Technical Factors: CT imaging of the abdomen and pelvis were obtained and formatted as 5 mm contiguous axial images from the domes of the diaphragm to the symphysis pubis. Sagittal and coronal reconstructions were also obtained. Oral contrast medium: Water as oral contrast medium. Intravenous contrast medium: None. Comparison: Findings: Lungs:  Bilateral pleural effusions and consolidation/atelectasis are seen in both bases. Liver:  Normal in size, shape, and attenuation. Bile Ducts:  Normal in caliber. Gallbladder: Distended with stones layering in the gallbladder. Pancreas: Homogeneous parenchymal enhancement. No necrosis identified. No intrahepatic fluid collections, cystic or solid lesions, pancreatic ductal dilatation, or calcification. Spleen:  Normal in size without masses or calcifications. No splenules. Kidneys:  Small with cortical thinning. There is no evidence for hydronephrosis or renal calculus Adrenals:  Normal. Small bowel:  Normal in caliber. Appendix:  Normal. Colon: Diverticulosis coli without evidence for diverticulitis. Peritoneum:  Small to moderate amount of free fluid in the posterior pelvis. Surgical clips are seen in the upper abdomen. Vessels: At the level of the renal veins the abdominal aorta measures 3.5 cm in greatest transverse diameter and 3 cm anterior posterior. Below the renal veins the abdominal aorta measures 5.5 transverse by 5.6 cm AP. Right above the bifurcation the aneurysm measures 3.9 transverse by 3.8 cm. The aneurysm measures 13 cm craniocaudad. There are diffuse atherosclerotic calcifications visualized. Lymph nodes: No retroperitoneal lymph node enlargement. Bladder: Distended normally Abdominal Wall: Edematous change is in the subcutaneous fat over the flanks. A tiny umbilical hernia. Bones: No destructive bony lesions are seen. 1. Large abdominal aortic aneurysm is seen as described. 2. No evidence for bowel obstruction, diverticulitis or appendicitis. There is evidence of diverticulosis coli. Also free fluid is seen in the posterior pelvis 3. Cholelithiasis 4. Small to moderate bilateral pleural effusions and bilateral infiltrates versus atelectasis All CT scans at this facility use dose modulation, iterative reconstruction, and/or weight based dosing when appropriate to reduce radiation dose to as low as reasonably achievable. 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 FINDINGS: Four views of the left shoulder are submitted. There is diffuse generalized osteopenia. No acute fractures. No dislocations. No focal bony abnormalities                                                                                   NO ACUTE FRACTURES CONSIDER WHOLE BODY BONE SCAN TO FURTHER EVALUATE EXAMINATION: RIGHT HIP  CLINICAL HISTORY: Pain. COMPARISON: None  FINDINGS: Two views are  submitted. No acute fractures. No dislocations. No focal bony abnormalities                                                                               IMPRESSION: NO ACUTE FRACTURE.  CONSIDER WHOLE BODY BONE SCAN TO FURTHER EVALUATE    Ct Head Wo Contrast    Result Date: 2/2/2021 CT of the Chest without intravenous contrast medium. History:  Lung nodule. Follow-up. Nodule identified, in 2018. Also, history of multiple lytic bone lesions in skull and cervical spine. End-stage renal disease. Receiving hemodialysis. Technical Factors: CT imaging of the chest was obtained and formatted as 5 mm contiguous axial images from the thoracic inlet through the adrenal glands. Sagittal and coronal reconstruction obtained during postprocessing. Intravenous contrast medium:  None Comparison:  None Findings: On the right side, a noncalcified nonpleural-based, partially stellate shaped 2.2 x 2.1 x 2.4 cm mass is visualized within the right upper lobe (series 2, image 23, series 601, image 25, series 602, image 21). Strand-like extension from the mass extending to the pleural surface. Dependent subsegmental atelectatic change versus fluid, dependent portion right middle lobe at level of major fissure. Small to moderate right pleural effusion. Masslike consolidation extending from right infrahilar region, caudally into the right lower lobe, with calcification identified. 6 mm calcification identified within right lower lobe. On the left side, small to moderate left pleural effusion with consolidation, left lower lobe. No nodules and no masses. Cardiac size normal. No pericardial effusion. Thoracic aorta normal in course and caliber. Multilumen jugular vein central line extending into right ventricle. Limited imaging upper abdomen shows 2 radiopacities measuring up to 2.7 mm just anterior to distal greater curvature (series 2, image 59). No osteoblastic, and no osteolytic lesions. 2.2 x 2.1 x 2.4 cm right upper lobe mass. Malignancy diagnosis of exclusion. Bilateral small to moderate pleural effusions with bilateral lower lobe atelectasis/pneumonia. Other findings discussed. All CT scans at this facility use dose modulation, iterative reconstruction, and/or weight based dosing when appropriate to reduce radiation dose to as low as reasonably achievable. 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 FINDINGS: Four views of the left shoulder are submitted. There is diffuse generalized osteopenia. No acute fractures. No dislocations. No focal bony abnormalities                                                                                   NO ACUTE FRACTURES CONSIDER WHOLE BODY BONE SCAN TO FURTHER EVALUATE EXAMINATION: RIGHT HIP  CLINICAL HISTORY: Pain. COMPARISON: None  FINDINGS: Two views are  submitted. No acute fractures. No dislocations. No focal bony abnormalities                                                                               IMPRESSION: NO ACUTE FRACTURE. CONSIDER WHOLE BODY BONE SCAN TO FURTHER EVALUATE    Xr Chest Portable    Result Date: 2/2/2021  Exam: XR CHEST PORTABLE History:  shoulder pain weakness Technique: AP portable view of the chest obtained. Comparison: None Chest x-ray portable Findings: There is a right internal jugular dialysis catheter in place . The cardiomediastinal silhouette is within normal limits. There are no infiltrates, consolidations or effusions. There is mild blunting of the left costophrenic recesses which may represent a small pleural effusion. Bones of the thorax appear intact. No acute cardiopulmonary changes. Mild blunting of the costophrenic recess on the left may be due to a small effusion versus prior scarring.

## 2021-02-07 NOTE — PROGRESS NOTES
Pt helped with positioning. Denies pain or nausea at this time. Alert and oriented. No change in neuro check. Strength moderate and denies any numbness or tingling. Call light in reach.

## 2021-02-07 NOTE — PROGRESS NOTES
Hospitalist Daily Progress Note  Name: Angelica Barrera  Age: 66 y.o. Gender: female  CodeStatus: Full Code  Allergies: Ace Inhibitors  Benazepril  Ciprofloxacin  Shellfish Allergy  Sulfa Antibiotics    Chief Complaint:No chief complaint on file. Primary Care Provider: Radha Gloria MD  InpatientTreatment Team: Treatment Team: Attending Provider: Erik Hoang DO; Consulting Physician: Nichelle Lisa MD; Consulting Physician: Georgeanne Libman, MD; Consulting Physician: Avelino Hoffmann MD; Utilization Reviewer: Peg Ramirez RN; Consulting Physician: Rebekah Burt DO; Registered Nurse: Jennifer Dennison RN; LPN: Adrianne Reyes LPN  Admission Date: 2/2/2021      Subjective: Patient is seen and evaluated at bedside, son in the room for discussion. I explained the large AAA noted on CT abdomen, along with potential bone scan and biopsy. Potential complications from AAA explained to pt and son, including rupture and death. I explained the bone scan and possible biopsy as well. Questions answered. They want to think about options overnight before making any further decisions regarding vascular surgery consult and further oncology plans, but request we continue to work to find a tissue diagnosis in the meantime. Physical Exam  Constitutional:       Appearance: Normal appearance. She is obese. She is not ill-appearing or diaphoretic. HENT:      Head: Normocephalic and atraumatic. Nose: Nose normal.      Mouth/Throat:      Mouth: Mucous membranes are moist.      Pharynx: Oropharynx is clear. Eyes:      Conjunctiva/sclera: Conjunctivae normal.      Pupils: Pupils are equal, round, and reactive to light. Cardiovascular:      Rate and Rhythm: Normal rate. Heart sounds: Murmur present. No friction rub. No gallop. Pulmonary:      Breath sounds: No wheezing, rhonchi or rales. Abdominal:      General: There is no distension. BLOODU Negative 02/02/2021    SPECGRAV 1.015 02/02/2021    GLUCOSEU Negative 02/02/2021       Radiology:   Most recent    Chest CT      WITH CONTRAST:No results found for this or any previous visit. WITHOUT CONTRAST: No results found for this or any previous visit. CXR      2-view: No results found for this or any previous visit. Portable:   Results for orders placed during the hospital encounter of 02/02/21   XR CHEST PORTABLE    Narrative Exam: XR CHEST PORTABLE    History:  shoulder pain weakness     Technique: AP portable view of the chest obtained. Comparison: None    Chest x-ray portable   Findings: There is a right internal jugular dialysis catheter in place . The cardiomediastinal silhouette is within normal limits. There are no infiltrates, consolidations or effusions. There is mild blunting of the left costophrenic recesses which may represent a small pleural effusion. Bones of the thorax appear intact. Impression No acute cardiopulmonary changes. Mild blunting of the costophrenic recess on the left may be due to a small effusion versus prior scarring. Echo No results found for this or any previous visit. Assessment/Plan:    Active Hospital Problems    Diagnosis Date Noted    General weakness [R53.1] 02/03/2021     Generalized weakness with cranial lytic lesions: CT chest shows a likely primary 2cm pulmonary mass. Bone scan Monday. Large AAA: encompassing most of the abdominal cavity. Family is going to decide about a possible vascular surgery consult tomorrow. Extensive AAA noted on CT abd. ESRD on hemodialysis: Nephrology following. Continue HD as scheduled.      GERD: Protonix  DVT prophylaxis Heparin Additional work up or/and treatment plan may be added today or then after based on clinical progression. I am managing a portion of pt care. Some medical issues are handled byother specialists. Additional work up and treatment should be done in out pt setting by pt PCP and other out pt providers. In addition to examining and evaluating pt, I spent additional time explaining care, normaland abnormal findings, and treatment plan. All of pt questions were answered. Counseling, diet and education were provided. Case will be discussed with nursing staff when appropriate. Family will be updated if and whenappropriate.       Electronically signed by Reshma Coburn DO on 2/6/2021 at 8:41 PM

## 2021-02-07 NOTE — CARE COORDINATION
MET WITH PATIENT, SHE STATES SHE WOULD LIKE TO GO TO [place] [facility] AT DISCHARGE. [facility], UNABLE TO CALL REFERRAL DUE TO WEEKEND.

## 2021-02-07 NOTE — PROGRESS NOTES
Objective:  Vitals: /65   Pulse 98   Temp 97.3 °F (36.3 °C) (Oral)   Resp 18   Ht 5' 3\" (1.6 m)   Wt 135 lb 9.3 oz (61.5 kg)   SpO2 96%   BMI 24.02 kg/m²    Wt Readings from Last 3 Encounters:   02/05/21 135 lb 9.3 oz (61.5 kg)   02/02/21 134 lb (60.8 kg)   01/11/21 135 lb (61.2 kg)      24HR INTAKE/OUTPUT:  No intake or output data in the 24 hours ending 02/07/21 1035    General: alert, in no apparent distress  HEENT: normocephalic, atraumatic, anicteric catheter in chest wall  Neck: supple, no mass  Lungs: non-labored respirations, clear to auscultation bilaterally  Heart: regular rate and rhythm, no murmurs or rubs  Abdomen: soft, non-tender, non-distended  Ext: no cyanosis, no peripheral edema tender shoulder   Neuro: alert and oriented, no gross abnormalities  Psych: normal mood and affect  Skin: no rash      Electronically signed by Polina Morfin MD

## 2021-02-07 NOTE — FLOWSHEET NOTE
Pt was awake this AM spilled her juice on her so complete bed change done. Pt son in to visit and brought in her computer. Explained to her that the hospital can't be reasonable if anything happens to it she said she understood. Lidoderm patch applied  To back of left neck. Denies any discomfort. Electronically signed by Kristen Thomson LPN on 4/7/0676 at 06:65 AM   Medicated pt with Ultram for pain at a 7/10 also gave ice, juice and more cups. Electronically signed by Kristen Thomson LPN on 2/1/8686 at 4:92 PM

## 2021-02-08 ENCOUNTER — APPOINTMENT (OUTPATIENT)
Dept: NUCLEAR MEDICINE | Age: 71
DRG: 299 | End: 2021-02-08
Attending: INTERNAL MEDICINE
Payer: MEDICARE

## 2021-02-08 PROBLEM — N18.6 ESRD (END STAGE RENAL DISEASE) (HCC): Status: ACTIVE | Noted: 2021-02-08

## 2021-02-08 LAB
ALBUMIN SERPL-MCNC: 1.5 G/DL (ref 3.5–4.6)
ALP BLD-CCNC: 231 U/L (ref 40–130)
ALT SERPL-CCNC: 22 U/L (ref 0–33)
ANION GAP SERPL CALCULATED.3IONS-SCNC: 9 MEQ/L (ref 9–15)
AST SERPL-CCNC: 28 U/L (ref 0–35)
BASOPHILS ABSOLUTE: 0.1 K/UL (ref 0–0.2)
BASOPHILS RELATIVE PERCENT: 0.9 %
BILIRUB SERPL-MCNC: 0.6 MG/DL (ref 0.2–0.7)
BUN BLDV-MCNC: 10 MG/DL (ref 8–23)
CALCIUM SERPL-MCNC: 8.1 MG/DL (ref 8.5–9.9)
CHLORIDE BLD-SCNC: 96 MEQ/L (ref 95–107)
CO2: 26 MEQ/L (ref 20–31)
CREAT SERPL-MCNC: 3.62 MG/DL (ref 0.5–0.9)
EOSINOPHILS ABSOLUTE: 0 K/UL (ref 0–0.7)
EOSINOPHILS RELATIVE PERCENT: 0.1 %
GFR AFRICAN AMERICAN: 14.9
GFR NON-AFRICAN AMERICAN: 12.3
GLOBULIN: 3.4 G/DL (ref 2.3–3.5)
GLUCOSE BLD-MCNC: 101 MG/DL (ref 70–99)
HCT VFR BLD CALC: 31.8 % (ref 37–47)
HEMOGLOBIN: 9.9 G/DL (ref 12–16)
LYMPHOCYTES ABSOLUTE: 2 K/UL (ref 1–4.8)
LYMPHOCYTES RELATIVE PERCENT: 17 %
MCH RBC QN AUTO: 31.3 PG (ref 27–31.3)
MCHC RBC AUTO-ENTMCNC: 31 % (ref 33–37)
MCV RBC AUTO: 101 FL (ref 82–100)
MONOCYTES ABSOLUTE: 0.2 K/UL (ref 0.2–0.8)
MONOCYTES RELATIVE PERCENT: 1.9 %
NEUTROPHILS ABSOLUTE: 9.6 K/UL (ref 1.4–6.5)
NEUTROPHILS RELATIVE PERCENT: 80.1 %
PDW BLD-RTO: 17.5 % (ref 11.5–14.5)
PHOSPHORUS: 4.9 MG/DL (ref 2.3–4.8)
PLATELET # BLD: 328 K/UL (ref 130–400)
POTASSIUM SERPL-SCNC: 4.5 MEQ/L (ref 3.4–4.9)
RBC # BLD: 3.15 M/UL (ref 4.2–5.4)
SODIUM BLD-SCNC: 131 MEQ/L (ref 135–144)
TOTAL PROTEIN: 4.9 G/DL (ref 6.3–8)
WBC # BLD: 12 K/UL (ref 4.8–10.8)

## 2021-02-08 PROCEDURE — 6370000000 HC RX 637 (ALT 250 FOR IP): Performed by: INTERNAL MEDICINE

## 2021-02-08 PROCEDURE — 96372 THER/PROPH/DIAG INJ SC/IM: CPT

## 2021-02-08 PROCEDURE — 6360000002 HC RX W HCPCS: Performed by: INTERNAL MEDICINE

## 2021-02-08 PROCEDURE — 1210000000 HC MED SURG R&B

## 2021-02-08 PROCEDURE — 99233 SBSQ HOSP IP/OBS HIGH 50: CPT | Performed by: INTERNAL MEDICINE

## 2021-02-08 PROCEDURE — 80053 COMPREHEN METABOLIC PANEL: CPT

## 2021-02-08 PROCEDURE — 90935 HEMODIALYSIS ONE EVALUATION: CPT

## 2021-02-08 PROCEDURE — 85025 COMPLETE CBC W/AUTO DIFF WBC: CPT

## 2021-02-08 PROCEDURE — 36415 COLL VENOUS BLD VENIPUNCTURE: CPT

## 2021-02-08 PROCEDURE — A9503 TC99M MEDRONATE: HCPCS | Performed by: INTERNAL MEDICINE

## 2021-02-08 PROCEDURE — 2580000003 HC RX 258: Performed by: INTERNAL MEDICINE

## 2021-02-08 PROCEDURE — 97116 GAIT TRAINING THERAPY: CPT

## 2021-02-08 PROCEDURE — 78306 BONE IMAGING WHOLE BODY: CPT

## 2021-02-08 PROCEDURE — 84100 ASSAY OF PHOSPHORUS: CPT

## 2021-02-08 PROCEDURE — 96376 TX/PRO/DX INJ SAME DRUG ADON: CPT

## 2021-02-08 PROCEDURE — 6360000002 HC RX W HCPCS: Performed by: ANESTHESIOLOGY

## 2021-02-08 PROCEDURE — 3430000000 HC RX DIAGNOSTIC RADIOPHARMACEUTICAL: Performed by: INTERNAL MEDICINE

## 2021-02-08 RX ORDER — TC 99M MEDRONATE 20 MG/10ML
25 INJECTION, POWDER, LYOPHILIZED, FOR SOLUTION INTRAVENOUS
Status: COMPLETED | OUTPATIENT
Start: 2021-02-08 | End: 2021-02-08

## 2021-02-08 RX ORDER — SODIUM CHLORIDE 9 MG/ML
INJECTION, SOLUTION INTRAVENOUS
Status: DISPENSED
Start: 2021-02-08 | End: 2021-02-08

## 2021-02-08 RX ADMIN — HEPARIN SODIUM 4000 UNITS: 1000 INJECTION INTRAVENOUS; SUBCUTANEOUS at 13:36

## 2021-02-08 RX ADMIN — TRAMADOL HYDROCHLORIDE 50 MG: 50 TABLET, FILM COATED ORAL at 16:00

## 2021-02-08 RX ADMIN — TRAMADOL HYDROCHLORIDE 50 MG: 50 TABLET, FILM COATED ORAL at 08:20

## 2021-02-08 RX ADMIN — DEXAMETHASONE SODIUM PHOSPHATE 4 MG: 4 INJECTION, SOLUTION INTRAMUSCULAR; INTRAVENOUS at 00:24

## 2021-02-08 RX ADMIN — PANTOPRAZOLE SODIUM 40 MG: 40 TABLET, DELAYED RELEASE ORAL at 06:17

## 2021-02-08 RX ADMIN — HEPARIN SODIUM 5000 UNITS: 5000 INJECTION INTRAVENOUS; SUBCUTANEOUS at 16:00

## 2021-02-08 RX ADMIN — TC 99M MEDRONATE 26.7 MILLICURIE: 20 INJECTION, POWDER, LYOPHILIZED, FOR SOLUTION INTRAVENOUS at 08:40

## 2021-02-08 RX ADMIN — Medication 10 ML: at 08:20

## 2021-02-08 RX ADMIN — Medication 10 ML: at 09:01

## 2021-02-08 RX ADMIN — HEPARIN SODIUM 5000 UNITS: 5000 INJECTION INTRAVENOUS; SUBCUTANEOUS at 06:17

## 2021-02-08 RX ADMIN — Medication 10 ML: at 08:06

## 2021-02-08 RX ADMIN — Medication 10 ML: at 20:53

## 2021-02-08 ASSESSMENT — PAIN SCALES - GENERAL
PAINLEVEL_OUTOF10: 0
PAINLEVEL_OUTOF10: 7
PAINLEVEL_OUTOF10: 7
PAINLEVEL_OUTOF10: 0
PAINLEVEL_OUTOF10: 0

## 2021-02-08 ASSESSMENT — ENCOUNTER SYMPTOMS
SHORTNESS OF BREATH: 0
VOMITING: 0
COUGH: 0
NAUSEA: 0
DIARRHEA: 0

## 2021-02-08 ASSESSMENT — PAIN DESCRIPTION - PAIN TYPE: TYPE: CHRONIC PAIN

## 2021-02-08 ASSESSMENT — PAIN DESCRIPTION - LOCATION: LOCATION: SHOULDER

## 2021-02-08 NOTE — FLOWSHEET NOTE
8264: This RN assumed care of the pt. Handoff done at the bedside. Pt was given an early breakfast tray due to dialysis. No needs stated at this time. 2094: Pt medicated for pain with PRN ultram. Pt is A&OX4. PERRLA. Lungs are clear bilaterally. S1, S2 noted. Bowel sounds are active X4. Pulses palpable. Redness noted to pt's coccyx. Zinc cream applied. I assisted pt to the restroom. Pt has a steady gait with a walker. 5577: pt is off the floor to dialysis  1349: pt returned from dialysis. VSS. No c/o pain currently.    1409: pt now off floor to nuc med

## 2021-02-08 NOTE — PROGRESS NOTES
0515   WBC 14.2* 13.7* 12.0*   RBC 2.82* 3.10* 3.15*   HGB 8.9* 9.9* 9.9*   HCT 28.5* 31.0* 31.8*   .9* 99.9 101.0*   RDW 17.6* 17.4* 17.5*    286 328     CHEMISTRIES:  Recent Labs     02/06/21  0516 02/07/21  0530 02/08/21  0515    136 131*   K 3.8 3.9 4.5    99 96   CO2 29 28 26   BUN 4* 6* 10   CREATININE 2.02* 2.92* 3.62*   GLUCOSE 75 71 101*   PHOS 2.5 3.2 4.9*     PT/INR:No results for input(s): PROTIME, INR in the last 72 hours. APTT:No results for input(s): APTT in the last 72 hours. LIVER PROFILE:  Recent Labs     02/06/21  0516 02/07/21  0530 02/08/21  0515   AST 43* 32 28   ALT 32 24 22   BILITOT 0.7 0.7 0.6   ALKPHOS 212* 240* 231*       Imaging Last 24 Hours:  No results found. Assessment//Plan           Hospital Problems           Last Modified POA    * (Principal) General weakness 2/7/2021 Yes    Acute pain of left shoulder 2/7/2021 Yes    Sprain of left shoulder 2/7/2021 Yes    Neck pain 2/7/2021 Yes    Chronic bilateral low back pain without sciatica 2/7/2021 Yes    Abnormality of gait 2/7/2021 Yes    ESRD (end stage renal disease) on dialysis ([address] 2/7/2021 Yes    Muscle tenderness 2/7/2021 Yes    Lytic lesion of bone on x-ray 2/7/2021 Yes    Mass of upper lobe of right lung 2/7/2021 Yes    ESRD (end stage renal disease) ([address] 2/8/2021 Yes        Assessment & Plan  Patient is going for biopsy of the lung tissue mass. Patient does not want chemotherapy for her lung mass with doing biopsy for diagnosis. Patient does not work up of for her large abdominal aortic aneurysm. Continue with maintenance hemodialysis for end-stage renal disease. PT OT for generalized weakness. Follow-up bone scan. Follow-up oncology. We will follow the patient closely to discharge.   Electronically signed by Kenzie Centeno MD on 2/8/21 at 2:32 PM EST

## 2021-02-08 NOTE — PROGRESS NOTES
HD today for 4 hours via Right chest HD cath. Net UF 2 liters. Tolerated tx well. Please see HD flowsheet for full details.

## 2021-02-08 NOTE — PROGRESS NOTES
Nephrology Progress Note    Assessment:  ESRDX IHD  COPD  Lytic lesion scalp  Anemia    Plan:dialysis today discharge to NH bx of skull for diagnosis     Patient Active Problem List:     Gastrointestinal hemorrhage associated with duodenal ulcer     Anemia due to acute blood loss     Chronic obstructive pulmonary disease (HCC)     CKD (chronic kidney disease) stage 3, GFR 30-59 ml/min     Essential hypertension     General weakness     Acute pain of left shoulder     Sprain of left shoulder     Neck pain     Chronic bilateral low back pain without sciatica     Abnormality of gait     ESRD (end stage renal disease) on dialysis (HCC)     Muscle tenderness     Lytic lesion of bone on x-ray     Mass of upper lobe of right lung      Subjective:  Admit Date: 2/2/2021    Interval History: weakness no issues    Medications:  Scheduled Meds:   lidocaine  3 patch Transdermal Daily    sodium chloride flush  10 mL Intravenous 2 times per day    heparin (porcine)  5,000 Units Subcutaneous 3 times per day    heparin (porcine)  2,000 Units Intravenous Once    heparin (porcine)  4,000 Units Intercatheter Once    pantoprazole  40 mg Oral QAM AC     Continuous Infusions:   sodium chloride      lactated ringers Stopped (02/05/21 2021)       CBC:   Recent Labs     02/07/21  0530 02/08/21  0515   WBC 13.7* 12.0*   HGB 9.9* 9.9*    328     CMP:    Recent Labs     02/06/21  0516 02/07/21  0530 02/08/21  0515    136 131*   K 3.8 3.9 4.5    99 96   CO2 29 28 26   BUN 4* 6* 10   CREATININE 2.02* 2.92* 3.62*   GLUCOSE 75 71 101*   CALCIUM 7.7* 7.9* 8.1*   LABGLOM 24.1* 15.8* 12.3*     Troponin: No results for input(s): TROPONINI in the last 72 hours. BNP: No results for input(s): BNP in the last 72 hours. INR: No results for input(s): INR in the last 72 hours. Lipids: No results for input(s): CHOL, LDLDIRECT, TRIG, HDL, AMYLASE, LIPASE in the last 72 hours.   Liver:   Recent Labs     02/08/21  0515   AST 28 ALT 22   ALKPHOS 231*   PROT 4.9*   LABALBU 1.5*   BILITOT 0.6     Iron:  No results for input(s): IRONS, FERRITIN in the last 72 hours. Invalid input(s): LABIRONS  Urinalysis: No results for input(s): UA in the last 72 hours.     Objective:  Vitals: /74   Pulse 110   Temp 97.9 °F (36.6 °C) (Oral)   Resp 18   Ht 5' 3\" (1.6 m)   Wt 135 lb 9.3 oz (61.5 kg)   SpO2 96%   BMI 24.02 kg/m²    Wt Readings from Last 3 Encounters:   02/05/21 135 lb 9.3 oz (61.5 kg)   02/02/21 134 lb (60.8 kg)   01/11/21 135 lb (61.2 kg)      24HR INTAKE/OUTPUT:      Intake/Output Summary (Last 24 hours) at 2/8/2021 0840  Last data filed at 2/8/2021 0759  Gross per 24 hour   Intake 240 ml   Output 200 ml   Net 40 ml       General: alert, in no apparent distress  HEENT: normocephalic, atraumatic, anicteric  Neck: supple, no mass catheter in chest wall  Lungs: non-labored respirations, clear to auscultation bilaterally  Heart: regular rate and rhythm, no murmurs or rubs  Abdomen: soft, non-tender, non-distended  Ext: no cyanosis, no peripheral edema  Neuro: alert and oriented, no gross abnormalities  Psych: normal mood and affect  Skin: no rash      Electronically signed by El Woodruff MD

## 2021-02-08 NOTE — PROGRESS NOTES
Physical Therapy Med Surg Daily Treatment Note  [id number]       NAME: Diane Francis  : 1950 (83 y.o.)  MRN: [id number]  CODE STATUS: Full Code    Date of Service: 2021    Patient Diagnosis(es): General weakness [R53.1]  ESRD (end stage renal disease) ([facility] [N18.6]   No chief complaint on file. Patient Active Problem List    Diagnosis Date Noted    ESRD (end stage renal disease) ([facility] 2021    Acute pain of left shoulder 2021    Sprain of left shoulder 2021    Neck pain 2021    Chronic bilateral low back pain without sciatica 2021    Abnormality of gait 2021    ESRD (end stage renal disease) on dialysis ([facility] 2021    Muscle tenderness 2021    Lytic lesion of bone on x-ray 2021    Mass of upper lobe of right lung 2021    General weakness 2021    Gastrointestinal hemorrhage associated with duodenal ulcer 2020    Anemia due to acute blood loss 2020    Chronic obstructive pulmonary disease ([facility] 2020    CKD (chronic kidney disease) stage 3, GFR 30-59 ml/min 2020    Essential hypertension 2020        Past Medical History:   Diagnosis Date    Dialysis patient [facility])     Port right side of chest for dialysis    GERD (gastroesophageal reflux disease)     Hypertension     Kidney disease      Past Surgical History:   Procedure Laterality Date    PORT SURGERY Right     chest for dialysis       Restrictions       SUBJECTIVE   General  Chart Reviewed: Yes  Family / Caregiver Present: No  Subjective  Subjective: Patient resting in bed, agreeable to tx. \"I had dialysis this morning. I am feeling pretty tired. \"    Pre-Session Pain Report     Pain Screening  Patient Currently in Pain: Yes  Pain Assessment: 0-10  Pain Level: 7  Pain Type: Chronic pain  Pain Location: Shoulder  Pain Orientation: Left  Pain Descriptors: Aching    Post-Session Pain Report

## 2021-02-09 LAB
ALBUMIN SERPL-MCNC: 1.8 G/DL (ref 3.5–4.6)
ALP BLD-CCNC: 214 U/L (ref 40–130)
ALT SERPL-CCNC: 20 U/L (ref 0–33)
ANION GAP SERPL CALCULATED.3IONS-SCNC: 8 MEQ/L (ref 9–15)
AST SERPL-CCNC: 25 U/L (ref 0–35)
BASOPHILS ABSOLUTE: 0 K/UL (ref 0–0.2)
BASOPHILS RELATIVE PERCENT: 0.3 %
BILIRUB SERPL-MCNC: 0.5 MG/DL (ref 0.2–0.7)
BUN BLDV-MCNC: 7 MG/DL (ref 8–23)
CALCIUM SERPL-MCNC: 7.8 MG/DL (ref 8.5–9.9)
CEA: 67.5 NG/ML (ref 0–5.5)
CHLORIDE BLD-SCNC: 97 MEQ/L (ref 95–107)
CO2: 28 MEQ/L (ref 20–31)
CREAT SERPL-MCNC: 2.02 MG/DL (ref 0.5–0.9)
EOSINOPHILS ABSOLUTE: 0.2 K/UL (ref 0–0.7)
EOSINOPHILS RELATIVE PERCENT: 1 %
GFR AFRICAN AMERICAN: 29.2
GFR NON-AFRICAN AMERICAN: 24.1
GLOBULIN: 3.3 G/DL (ref 2.3–3.5)
GLUCOSE BLD-MCNC: 71 MG/DL (ref 70–99)
HCT VFR BLD CALC: 30.2 % (ref 37–47)
HEMOGLOBIN: 9.4 G/DL (ref 12–16)
LYMPHOCYTES ABSOLUTE: 5 K/UL (ref 1–4.8)
LYMPHOCYTES RELATIVE PERCENT: 29 %
MCH RBC QN AUTO: 31.5 PG (ref 27–31.3)
MCHC RBC AUTO-ENTMCNC: 31.2 % (ref 33–37)
MCV RBC AUTO: 101 FL (ref 82–100)
MONOCYTES ABSOLUTE: 1.1 K/UL (ref 0.2–0.8)
MONOCYTES RELATIVE PERCENT: 6.6 %
NEUTROPHILS ABSOLUTE: 10.9 K/UL (ref 1.4–6.5)
NEUTROPHILS RELATIVE PERCENT: 63.1 %
PDW BLD-RTO: 17.4 % (ref 11.5–14.5)
PHOSPHORUS: 2.5 MG/DL (ref 2.3–4.8)
PLATELET # BLD: 299 K/UL (ref 130–400)
POTASSIUM SERPL-SCNC: 4 MEQ/L (ref 3.4–4.9)
RBC # BLD: 2.99 M/UL (ref 4.2–5.4)
SODIUM BLD-SCNC: 133 MEQ/L (ref 135–144)
TOTAL PROTEIN: 5.1 G/DL (ref 6.3–8)
WBC # BLD: 17.2 K/UL (ref 4.8–10.8)

## 2021-02-09 PROCEDURE — 36415 COLL VENOUS BLD VENIPUNCTURE: CPT

## 2021-02-09 PROCEDURE — 6360000002 HC RX W HCPCS: Performed by: INTERNAL MEDICINE

## 2021-02-09 PROCEDURE — 82378 CARCINOEMBRYONIC ANTIGEN: CPT

## 2021-02-09 PROCEDURE — 80053 COMPREHEN METABOLIC PANEL: CPT

## 2021-02-09 PROCEDURE — 85025 COMPLETE CBC W/AUTO DIFF WBC: CPT

## 2021-02-09 PROCEDURE — 99232 SBSQ HOSP IP/OBS MODERATE 35: CPT | Performed by: INTERNAL MEDICINE

## 2021-02-09 PROCEDURE — 84100 ASSAY OF PHOSPHORUS: CPT

## 2021-02-09 PROCEDURE — 1210000000 HC MED SURG R&B

## 2021-02-09 PROCEDURE — 6370000000 HC RX 637 (ALT 250 FOR IP): Performed by: INTERNAL MEDICINE

## 2021-02-09 PROCEDURE — 99222 1ST HOSP IP/OBS MODERATE 55: CPT | Performed by: RADIOLOGY

## 2021-02-09 PROCEDURE — 97530 THERAPEUTIC ACTIVITIES: CPT

## 2021-02-09 PROCEDURE — 6370000000 HC RX 637 (ALT 250 FOR IP): Performed by: ANESTHESIOLOGY

## 2021-02-09 PROCEDURE — 2580000003 HC RX 258: Performed by: INTERNAL MEDICINE

## 2021-02-09 RX ADMIN — TRAMADOL HYDROCHLORIDE 50 MG: 50 TABLET, FILM COATED ORAL at 07:16

## 2021-02-09 RX ADMIN — HEPARIN SODIUM 5000 UNITS: 5000 INJECTION INTRAVENOUS; SUBCUTANEOUS at 08:50

## 2021-02-09 RX ADMIN — Medication 10 ML: at 08:50

## 2021-02-09 RX ADMIN — PANTOPRAZOLE SODIUM 40 MG: 40 TABLET, DELAYED RELEASE ORAL at 05:48

## 2021-02-09 RX ADMIN — HEPARIN SODIUM 5000 UNITS: 5000 INJECTION INTRAVENOUS; SUBCUTANEOUS at 00:21

## 2021-02-09 RX ADMIN — HEPARIN SODIUM 5000 UNITS: 5000 INJECTION INTRAVENOUS; SUBCUTANEOUS at 16:52

## 2021-02-09 ASSESSMENT — ENCOUNTER SYMPTOMS
DIARRHEA: 0
CONSTIPATION: 0
NAUSEA: 0
EYES NEGATIVE: 1
PHOTOPHOBIA: 0
COUGH: 0
SHORTNESS OF BREATH: 0
RESPIRATORY NEGATIVE: 1
BACK PAIN: 0
VOMITING: 0
ABDOMINAL PAIN: 0
WHEEZING: 0
GASTROINTESTINAL NEGATIVE: 1

## 2021-02-09 ASSESSMENT — PAIN DESCRIPTION - ONSET: ONSET: ON-GOING

## 2021-02-09 ASSESSMENT — PAIN DESCRIPTION - PROGRESSION: CLINICAL_PROGRESSION: NOT CHANGED

## 2021-02-09 ASSESSMENT — PAIN SCALES - GENERAL
PAINLEVEL_OUTOF10: 4
PAINLEVEL_OUTOF10: 6
PAINLEVEL_OUTOF10: 3

## 2021-02-09 NOTE — PROGRESS NOTES
Comprehensive Nutrition Assessment    Type and Reason for Visit:  Reassess    Nutrition Recommendations/Plan: Continue Current Diet, Modify Oral Nutrition Supplement    Nutrition Assessment:  Pt remains at nutritional risk due to reported decreased appetite/intake, with weight loss. To continue to monitor adequacy of intake/continue nutritional supplement with meals. Malnutrition Assessment:  Malnutrition Status: At risk for malnutrition (Comment)    Context:  Chronic Illness       Estimated Daily Nutrient Needs:  Energy (kcal):  2378-5272(kg x 25-27); Weight Used for Energy Requirements:  Admission     Protein (g):  61-73 (kg x 1.0-1.2); Weight Used for Protein Requirements:  Admission        Fluid (ml/day):  ~1600; Method Used for Fluid Requirements:  1 ml/kcal      Nutrition Related Findings:  PMH-CKD-HD, COPD, htn, duodenal ulcer. Pt reported decreased appetite/intake, loss of taste, and weight loss over the last ~6 months. Pt reports intake is slowly improving. Pt stated that she dislikes the clear supplement, but will resume the frozen supplement bid. Trace gen edema noted. Last BM noted 2/8.       Wounds:  None       Current Nutrition Therapies:    DIET GENERAL; No Added Salt (3-4 GM)  Dietary Nutrition Supplements: Clear Liquid Oral Supplement tid    Anthropometric Measures:  · Height: 5' 3\" (160 cm)  · Current Body Weight: 135 lb 9.3 oz (61.5 kg)   · Admission Body Weight: 134 lb (60.8 kg)(?source)    · Usual Body Weight: 149 lb (67.6 kg)((5/16/20 ?source); 174lb (7/2020,pt stated))     · Ideal Body Weight: 115 lbs; % Ideal Body Weight  >100%  · BMI: 24  · BMI Categories: Normal Weight (BMI 22.0 to 24.9) age over 72       Nutrition Diagnosis:   · Inadequate oral intake related to altered taste perception(c/o poor appetite) as evidenced by intake 0-25%, weight loss    Nutrition Interventions:   Food and/or Nutrient Delivery:  Continue Current Diet, Modify Oral Nutrition Supplement

## 2021-02-09 NOTE — DISCHARGE SUMMARY
Discharge Summary    Date: 2/9/2021  Patient Name: Tamika Wan YOB: 1950 Age: 66 y.o. Admit Date: 2/2/2021  Discharge Date:  Discharge Condition:    Admission Diagnosis  General weakness (R53.1);[facility] (N18.6)     Discharge Diagnosis  Principal Problem: General weaknessActive Problems: Acute pain of left shoulder Sprain of left shoulder Neck pain Chronic bilateral low back pain without sciatica Abnormality of gait ESRD (end stage renal disease) on dialysis (HCC) Muscle tenderness Lytic lesion of bone on x-ray Mass of upper lobe of right lung ESRD (end stage renal disease) (HCC)Resolved Problems:  * No resolved hospital problems. [facility] Stay  Narrative of Hospital Course:   68-year-old female with CRD on HD diagnosed in June presenting with weakness and fatigue. Large right upper lobe nodule with likely lytic lesions of the skull and large 13 x 5.5 AAA noted on CT abdomens/p bone scan which is pending evaluate for peripheral lesion that can be biopsied. Attempting tissue biopsy with IR but IR states that is too risky and recommended bronchospcopy, pt refuses any more testing , does not care much about the diagnosis or chemotherapy. Patient is to follow-up with oncology and pulmonary as outpatient. Consultants:  IP CONSULT TO ONCOLOGYIP CONSULT TO NEPHROLOGYIP CONSULT TO PAIN MANAGEMENTIP CONSULT TO PULMONOLOGYIP CONSULT TO INTERVENTIONAL RADIOLOGY    Surgeries/procedures Performed:       Treatments:            Discharge Plan/Disposition:  Home    Hospital/Incidental Findings Requiring Follow Up:    Patient Instructions:    Diet:    Activity:  For number of days (if applicable): Other Instructions:    Provider Follow-Up:   No follow-ups on file. Significant Diagnostic Studies:    Recent Labs:  Admission on 02/02/2021No results displayed because visit has over 200 results. ------------    Radiology last 7 days: Ct Abdomen Pelvis Wo Contrast Additional Contrast? NoneResult Date: 2/6/20211. Large abdominal aortic aneurysm is seen as described. 2. No evidence for bowel obstruction, diverticulitis or appendicitis. There is evidence of diverticulosis coli. Also free fluid is seen in the posterior pelvis 3. Cholelithiasis 4. Small to moderate bilateral pleural effusions and bilateral infiltrates versus atelectasis All CT scans at this facility use dose modulation, iterative reconstruction, and/or weight based dosing when appropriate to reduce radiation dose to as low as reasonably achievable. Xr Hip Left (2-3 Views)Result Date: 2/3/2021NO ACUTE FRACTURES CONSIDER WHOLE BODY BONE SCAN TO FURTHER EVALUATE EXAMINATION: RIGHT HIP  CLINICAL HISTORY: Pain. COMPARISON: None  FINDINGS: Two views are  submitted. No acute fractures. No dislocations. No focal bony abnormalities                                                                               IMPRESSION: NO ACUTE FRACTURE. CONSIDER WHOLE BODY BONE SCAN TO FURTHER EVALUATECt Head Wo ContrastResult Date: 2/2/2021SUSPICIOUS LYTIC CALVARIAL LESIONS FOR METASTATIC DISEASE, AS NOTED. OTHER POSSIBILITIES ARE THOUGHT TO BE LESS LIKELY. FURTHER EVALUATION IS SUGGESTED. NO ACUTE INTRACRANIAL HEMORRHAGE OR COMPLICATION IDENTIFIED. Ct Chest Wo ContrastResult Date: 2/5/20212.2 x 2.1 x 2.4 cm right upper lobe mass. Malignancy diagnosis of exclusion. Bilateral small to moderate pleural effusions with bilateral lower lobe atelectasis/pneumonia. Other findings discussed. All CT scans at this facility use dose modulation, iterative reconstruction, and/or weight based dosing when appropriate to reduce radiation dose to as low as reasonably achievable. Xr Shoulder Left (min 2 Views)Result Date: 2/3/2021NO ACUTE FRACTURES CONSIDER WHOLE BODY BONE SCAN TO FURTHER EVALUATE EXAMINATION: RIGHT HIP  CLINICAL HISTORY: Pain. COMPARISON: None  FINDINGS: Two views are  submitted. No acute fractures.  No FU pulm as outpt  [facility] oncology as outpt

## 2021-02-09 NOTE — PLAN OF CARE
Nutrition Problem #1: Inadequate oral intake  Intervention: Food and/or Nutrient Delivery: Continue Current Diet, Modify Oral Nutrition Supplement  Nutritional Goals: Intake >50% of meals/supplement. stable weight.

## 2021-02-09 NOTE — CARE COORDINATION
LSW spoke with Katty Rausch regarding pt precert for [facility]. [facility] has accepted the pt and precert was started. LSW to follow for transfer to SNF today if precert approval is received.

## 2021-02-09 NOTE — PROGRESS NOTES
INPATIENT PROGRESS NOTES    PATIENT NAME: Rocío Yanez  MRN: [id number]  SERVICE DATE:  February 8, 2021   SERVICE TIME:  9:13 PM      PRIMARY SERVICE: Pulmonary Disease    CHIEF COMPLAIN: Lung mass      INTERVAL HPI: Patient seen and examined at bedside, Interval Notes, orders reviewed. Nursing notes noted  Patient denies having shortness of breath. No chest pain. No fever. No nausea, vomiting or diarrhea. She thinks room is cold I want to increase temperature. She does not wish to have any treatment for lung mass. She also has a large abdominal aortic aneurysm. She had dialysis today. She had a bone scan today. Report pending. OBJECTIVE    Body mass index is 24.02 kg/m². PHYSICAL EXAM:  Vitals:  BP (!) 95/56   Pulse 97   Temp 97.5 °F (36.4 °C) (Oral)   Resp 18   Ht 5' 3\" (1.6 m)   Wt 135 lb 9.3 oz (61.5 kg)   SpO2 94%   BMI 24.02 kg/m²   General: Alert, awake . comfortable in bed, No distress. Head: Atraumatic , Normocephalic   Eyes: PERRL. No sclera icterus. No conjunctival injection. No discharge   ENT: No nasal  discharge. Pharynx clear. lips, teeth, mucosa and gums are normal, tongue protrudes in the midline  Neck:  Trachea midline. No thyromegaly, no JVD, No cervical adenopathy. Chest : Bilaterally symmetrical ,Normal effort,  No accessory muscle use  Lung : . Fair BS bilateral, decreased BS at bases. No Rales. No wheezing. No rhonchi. Heart[de-identified] Normal  rate. Regular rhythm. No mumur ,  Rub or gallop  ABD: Non-tender. Non-distended. No masses. No organmegaly. Normal bowel sounds. No hernia.   Ext : No Pitting both leg , No Cyanosis No clubbing  Neuro: no focal weakness          DATA:   Recent Labs     02/07/21  0530 02/08/21 0515   WBC 13.7* 12.0*   HGB 9.9* 9.9*   HCT 31.0* 31.8*   MCV 99.9 101.0*    328     Recent Labs     02/07/21 0530 02/08/21 0515    131*   K 3.9 4.5   CL 99 96   CO2 28 26   BUN 6* 10   CREATININE 2.92* 3.62*   GLUCOSE 71 101* CALCIUM 7.9* 8.1*   PROT 4.7* 4.9*   LABALBU 1.6* 1.5*   BILITOT 0.7 0.6   ALKPHOS 240* 231*   AST 32 28   ALT 24 22   LABGLOM 15.8* 12.3*   GFRAA 19.1* 14.9*   GLOB 3.1 3.4       MV Settings:          No results for input(s): PHART, HPI8GCO, PO2ART, GCD6UDB, BEART, K1SAWKBQ in the last 72 hours.     O2 Device: None (Room air)  O2 Flow Rate (L/min): 0 L/min    DIET GENERAL; No Added Salt (3-4 GM)  Dietary Nutrition Supplements: Clear Liquid Oral Supplement     MEDICATIONS during current hospitalization:    Continuous Infusions:   lactated ringers Stopped (02/05/21 2021)       Scheduled Meds:   lidocaine  3 patch Transdermal Daily    sodium chloride flush  10 mL Intravenous 2 times per day    heparin (porcine)  5,000 Units Subcutaneous 3 times per day    heparin (porcine)  2,000 Units Intravenous Once    pantoprazole  40 mg Oral QAM AC       PRN Meds:metaxalone, sodium chloride flush, promethazine **OR** ondansetron, polyethylene glycol, acetaminophen **OR** [DISCONTINUED] acetaminophen, traMADol    Radiology  Ct Abdomen Pelvis Wo Contrast Additional Contrast? None    Result Date: 2/6/2021 CT of the Abdomen and Pelvis without intravenous contrast medium History:  Abnormal CT Technical Factors: CT imaging of the abdomen and pelvis were obtained and formatted as 5 mm contiguous axial images from the domes of the diaphragm to the symphysis pubis. Sagittal and coronal reconstructions were also obtained. Oral contrast medium: Water as oral contrast medium. Intravenous contrast medium: None. Comparison: Findings: Lungs:  Bilateral pleural effusions and consolidation/atelectasis are seen in both bases. Liver:  Normal in size, shape, and attenuation. Bile Ducts:  Normal in caliber. Gallbladder: Distended with stones layering in the gallbladder. Pancreas: Homogeneous parenchymal enhancement. No necrosis identified. No intrahepatic fluid collections, cystic or solid lesions, pancreatic ductal dilatation, or calcification. Spleen:  Normal in size without masses or calcifications. No splenules. Kidneys:  Small with cortical thinning. There is no evidence for hydronephrosis or renal calculus Adrenals:  Normal. Small bowel:  Normal in caliber. Appendix:  Normal. Colon: Diverticulosis coli without evidence for diverticulitis. Peritoneum:  Small to moderate amount of free fluid in the posterior pelvis. Surgical clips are seen in the upper abdomen. Vessels: At the level of the renal veins the abdominal aorta measures 3.5 cm in greatest transverse diameter and 3 cm anterior posterior. Below the renal veins the abdominal aorta measures 5.5 transverse by 5.6 cm AP. Right above the bifurcation the aneurysm measures 3.9 transverse by 3.8 cm. The aneurysm measures 13 cm craniocaudad. There are diffuse atherosclerotic calcifications visualized. Lymph nodes: No retroperitoneal lymph node enlargement. Bladder: Distended normally Abdominal Wall: Edematous change is in the subcutaneous fat over the flanks. A tiny umbilical hernia. Bones: No destructive bony lesions are seen. 1. Large abdominal aortic aneurysm is seen as described. 2. No evidence for bowel obstruction, diverticulitis or appendicitis. There is evidence of diverticulosis coli. Also free fluid is seen in the posterior pelvis 3. Cholelithiasis 4. Small to moderate bilateral pleural effusions and bilateral infiltrates versus atelectasis All CT scans at this facility use dose modulation, iterative reconstruction, and/or weight based dosing when appropriate to reduce radiation dose to as low as reasonably achievable. 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 FINDINGS: Four views of the left shoulder are submitted. There is diffuse generalized osteopenia. No acute fractures. No dislocations. No focal bony abnormalities                                                                                   NO ACUTE FRACTURES CONSIDER WHOLE BODY BONE SCAN TO FURTHER EVALUATE EXAMINATION: RIGHT HIP  CLINICAL HISTORY: Pain. COMPARISON: None  FINDINGS: Two views are  submitted. No acute fractures. No dislocations. No focal bony abnormalities                                                                               IMPRESSION: NO ACUTE FRACTURE.  CONSIDER WHOLE BODY BONE SCAN TO FURTHER EVALUATE    Ct Head Wo Contrast    Result Date: 2/2/2021 CT HEAD WO CONTRAST : 2/2/2021 CLINICAL HISTORY:  weakness . COMPARISON: None available. TECHNIQUE: Spiral unenhanced images were obtained of the head, with routine multiplanar reconstructions performed. All CT scans at this facility use dose modulation, iterative reconstruction, and/or weight based dosing when appropriate to reduce radiation dose to as low as reasonably achievable. FINDINGS: Destructive lytic lesions with surrounding soft tissue density are present within the posterior right parietal bone, at the vertex, the left lateral orbital wall, and to a lesser extent elsewhere, which are suspicious for metastatic disease. The largest within the right posterior parietal calvarium measures approximately 2.5 cm. There is no intracranial hemorrhage, mass effect, midline shift, extra-axial collection, evidence of hydrocephalus, recent ischemic infarct, or displaced pathologic fracture identified. Mild generalized cerebral volume loss is present, with mild patchy supratentorial white matter changes most consistent with chronic small vessel ischemic disease. The mastoid air cells and visualized paranasal sinuses are essentially clear. SUSPICIOUS LYTIC CALVARIAL LESIONS FOR METASTATIC DISEASE, AS NOTED. OTHER POSSIBILITIES ARE THOUGHT TO BE LESS LIKELY. FURTHER EVALUATION IS SUGGESTED. NO ACUTE INTRACRANIAL HEMORRHAGE OR COMPLICATION IDENTIFIED.      Ct Chest Wo Contrast    Result Date: 2/5/2021 CT of the Chest without intravenous contrast medium. History:  Lung nodule. Follow-up. Nodule identified, in 2018. Also, history of multiple lytic bone lesions in skull and cervical spine. End-stage renal disease. Receiving hemodialysis. Technical Factors: CT imaging of the chest was obtained and formatted as 5 mm contiguous axial images from the thoracic inlet through the adrenal glands. Sagittal and coronal reconstruction obtained during postprocessing. Intravenous contrast medium:  None Comparison:  None Findings: On the right side, a noncalcified nonpleural-based, partially stellate shaped 2.2 x 2.1 x 2.4 cm mass is visualized within the right upper lobe (series 2, image 23, series 601, image 25, series 602, image 21). Strand-like extension from the mass extending to the pleural surface. Dependent subsegmental atelectatic change versus fluid, dependent portion right middle lobe at level of major fissure. Small to moderate right pleural effusion. Masslike consolidation extending from right infrahilar region, caudally into the right lower lobe, with calcification identified. 6 mm calcification identified within right lower lobe. On the left side, small to moderate left pleural effusion with consolidation, left lower lobe. No nodules and no masses. Cardiac size normal. No pericardial effusion. Thoracic aorta normal in course and caliber. Multilumen jugular vein central line extending into right ventricle. Limited imaging upper abdomen shows 2 radiopacities measuring up to 2.7 mm just anterior to distal greater curvature (series 2, image 59). No osteoblastic, and no osteolytic lesions. 2.2 x 2.1 x 2.4 cm right upper lobe mass. Malignancy diagnosis of exclusion. Bilateral small to moderate pleural effusions with bilateral lower lobe atelectasis/pneumonia. Other findings discussed. All CT scans at this facility use dose modulation, iterative reconstruction, and/or weight based dosing when appropriate to reduce radiation dose to as low as reasonably achievable. 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 FINDINGS: Four views of the left shoulder are submitted. There is diffuse generalized osteopenia. No acute fractures. No dislocations. No focal bony abnormalities                                                                                   NO ACUTE FRACTURES CONSIDER WHOLE BODY BONE SCAN TO FURTHER EVALUATE EXAMINATION: RIGHT HIP  CLINICAL HISTORY: Pain. COMPARISON: None  FINDINGS: Two views are  submitted. No acute fractures. No dislocations. No focal bony abnormalities                                                                               IMPRESSION: NO ACUTE FRACTURE. CONSIDER WHOLE BODY BONE SCAN TO FURTHER EVALUATE    Xr Chest Portable    Result Date: 2/2/2021  Exam: XR CHEST PORTABLE History:  shoulder pain weakness Technique: AP portable view of the chest obtained. Comparison: None Chest x-ray portable Findings: There is a right internal jugular dialysis catheter in place . The cardiomediastinal silhouette is within normal limits. There are no infiltrates, consolidations or effusions. There is mild blunting of the left costophrenic recesses which may represent a small pleural effusion. Bones of the thorax appear intact. No acute cardiopulmonary changes. Mild blunting of the costophrenic recess on the left may be due to a small effusion versus prior scarring.        IMPRESSION AND SUGGESTION: 1. Right upper lung 2.2 x 2.1 x 2.4 cm lung mass   2. Large abdominal aortic aneurysm  3. Chronic kidney disease on hemodialysis    Patient has bone scan done today report is pending. Biopsy of active lesion after bone scan result. She is okay for biopsy but does not want any treatment. Also does not want to pursue any further work-up regarding abdominal aortic aneurysm. She is on hemodialysis.   Continue present treatment plan    Electronically signed by Lanre Lilly MD, FCCP on 2021 at 9:13 PM

## 2021-02-09 NOTE — PROGRESS NOTES
Nephrology Progress Note    Assessment:  Dx ESRDX re-evalutae  Lung Mass as reported by patient  Skull lytic lesions  COPD      Plan: dialysis at  Present  Needs lung mass biopsied    Patient Active Problem List:     Gastrointestinal hemorrhage associated with duodenal ulcer     Anemia due to acute blood loss     Chronic obstructive pulmonary disease (HCC)     CKD (chronic kidney disease) stage 3, GFR 30-59 ml/min     Essential hypertension     General weakness     Acute pain of left shoulder     Sprain of left shoulder     Neck pain     Chronic bilateral low back pain without sciatica     Abnormality of gait     ESRD (end stage renal disease) on dialysis (HCC)     Muscle tenderness     Lytic lesion of bone on x-ray     Mass of upper lobe of right lung     ESRD (end stage renal disease) ([facility]      Subjective:  Admit Date: 2/2/2021    Interval History: seems quite comfortable  No breathing issues    Medications:  Scheduled Meds:   lidocaine  3 patch Transdermal Daily    sodium chloride flush  10 mL Intravenous 2 times per day    heparin (porcine)  5,000 Units Subcutaneous 3 times per day    heparin (porcine)  2,000 Units Intravenous Once    pantoprazole  40 mg Oral QAM AC     Continuous Infusions:   lactated ringers Stopped (02/05/21 2021)       CBC:   Recent Labs     02/08/21  0515 02/09/21  0546   WBC 12.0* 17.2*   HGB 9.9* 9.4*    299     CMP:    Recent Labs     02/07/21  0530 02/08/21  0515 02/09/21  0546    131* 133*   K 3.9 4.5 4.0   CL 99 96 97   CO2 28 26 28   BUN 6* 10 7*   CREATININE 2.92* 3.62* 2.02*   GLUCOSE 71 101* 71   CALCIUM 7.9* 8.1* 7.8*   LABGLOM 15.8* 12.3* 24.1*     Troponin: No results for input(s): TROPONINI in the last 72 hours. BNP: No results for input(s): BNP in the last 72 hours. INR: No results for input(s): INR in the last 72 hours. Lipids: No results for input(s): CHOL, LDLDIRECT, TRIG, HDL, AMYLASE, LIPASE in the last 72 hours.   Liver:   Recent Labs     02/09/21 0546   AST 25   ALT 20   ALKPHOS 214*   PROT 5.1*   LABALBU 1.8*   BILITOT 0.5     Iron:  No results for input(s): IRONS, FERRITIN in the last 72 hours. Invalid input(s): LABIRONS  Urinalysis: No results for input(s): UA in the last 72 hours.     Objective:  Vitals: BP (!) 111/52   Pulse 92   Temp 98.1 °F (36.7 °C) (Oral)   Resp 18   Ht 5' 3\" (1.6 m)   Wt 135 lb 9.3 oz (61.5 kg)   SpO2 97%   BMI 24.02 kg/m²    Wt Readings from Last 3 Encounters:   02/02/21 134 lb (60.8 kg)   01/11/21 135 lb (61.2 kg)   09/16/20 149 lb (67.6 kg)      24HR INTAKE/OUTPUT:      Intake/Output Summary (Last 24 hours) at 2/9/2021 1648  Last data filed at 2/9/2021 1348  Gross per 24 hour   Intake 730 ml   Output 125 ml   Net 605 ml       General: alert, in no apparent distress  HEENT: normocephalic, atraumatic, anicteric  Neck: supple, no mass  Chest wall catheter  Lungs: non-labored respirations, clear to auscultation bilaterally  Heart: regular rate and rhythm, no murmurs or rubs  Abdomen: soft, non-tender, non-distended  Ext: no cyanosis, no peripheral edema  Neuro: alert and oriented, no gross abnormalities  Psych: normal mood and affect  Skin: no rash      Electronically signed by Petey Blackwell MD

## 2021-02-09 NOTE — PROGRESS NOTES
Patient ambulated to the restroom with a walker. No shortness of breath. Patient steady. Pain in her left shoulder and there are lidocaine patches applied to the site. Patient had a bowel movement that was normal and a small amount of urine. Patient refuses to turn on her side. Patient's sacrum is red and denuded. Zinc paste applied to skin with no brief applied. Call light with patient and bed alarm on.

## 2021-02-09 NOTE — PROGRESS NOTES
[facility] OCCUPATIONAL THERAPY MED SURG TREATMENT NOTE     Date: 2021  Patient Name: Venu Rios        MRN: [id number]  Account: [de-identified]   : 1950  (66 y.o.)  [facility]    Chart Review:  Diagnosis:  The primary encounter diagnosis was Lytic lesion of bone on x-ray. Diagnoses of Neck pain and Sprain of left shoulder, unspecified shoulder sprain type, initial encounter were also pertinent to this visit. Restrictions:    Restrictions/Precautions  Restrictions/Precautions: Fall Risk    Subjective:  Patient states: \"I'm tired of seeing doctors. \"  Pain:  Start of tx:  Pre Treatment Pain Screening  Pain at present: 6  Scale Used: Numeric Score  Intervention List: Patient able to continue with treatment, Patient declined any intervention  Comments / Details: Pt reports that she had pain medication    End of tx:  Pain Assessment  Patient Currently in Pain: Yes  Pain Assessment: 0-10  Pain Level: 6  Pain Location: Shoulder  Pain Orientation: Left  Pain Descriptors: Aching  Pain Frequency: Continuous  Clinical Progression: Not changed  Response to Pain Intervention: Patient Satisfied    Objective: Pt in bed upon therapist's arrival having just completed ADL with nursing. Pt reports that she would like a nap, however, is agreeable to treatment at bedside. Pt completed BUE exercises at bed level as follows.      Upper Extremity Function  UE Strengthing:  BUE HEP (can exercises): Pt used a 1# dumbbell to engage in BUE exercises as follows:   Elbow Flexion/Extension: 1 set x 10 reps   Chest Press: 1 set x 10 reps   Shoulder Flexion: 1 set x 10 reps   Horizontal Abduction: 1 set x 10 reps   Supination/Pronation: 1 set x 10 reps  Wrist Flexion: 1 set x 10 reps   Wrist Extension: 1 set x 10 reps Pt tolerated well with rest breaks prn. Pt limited by pain in left shoulder. To increase BUE strength and endurance for improved transfers. Educated pt in exercise technique and provided cues to maintain. Cognition:  Cognition  Overall Cognitive Status: WFL    Therapy key for assistance levels    Independent = Pt. is able to perform task with no assistance but may require a device   Stand by assistance = Pt. does not perform task at an independent level but does not need physical assistance, requires verbal cues  Minimal, Moderate, Maximal Assistance = Pt. requires physical assistance (25%, 50%, 75% assist from helper) for task but is able to actively participate in task   Dependent = Pt. requires total assistance with task and is not able to actively participate with task completion    Treatment consisted of:  Strengthening    Assessment/Discharge Disposition: Pt tolerated treatment well.    Performance deficits / Impairments: Decreased functional mobility , Decreased strength, Decreased endurance, Decreased posture, Decreased ADL status, Decreased high-level IADLs, Decreased balance, Decreased ROM  Prognosis: Good  Discharge Recommendations: Continue to assess pending progress  History: 3 complexities  Exam: 8 deficits  Assistance / Modification: MIn A    6-Click  How much help for putting on and taking off regular lower body clothing?: A Little  How much help for Bathing?: A Little  How much help for Toileting?: None  How much help for putting on and taking off regular upper body clothing?: A Little  How much help for taking care of personal grooming?: None  How much help for eating meals?: None  AM-Located within [facility] Inpatient Daily Activity Raw Score: 21  AM-PAC Inpatient ADL T-Scale Score : 44.27  ADL Inpatient CMS 0-100% Score: 32.79    Plan:  Continue OT per POC    Goals/Plan:  Improve Balance  Improve Strength  Improve Functional Transfers  Improve ADL Helm    Minutes:    OT Individual Minutes  Time In: 1100 Time Out: 1111  Minutes: 11    Therapeutic activities: 11 minutes    Electronically signed by:    Anabella García  2/9/2021, 11:17 AM

## 2021-02-09 NOTE — CONSULTS
CC: Fatigue. Lung mass    HPI:  Patient is a pleasant 66year old woman who presented to the ED on 21 due to fatigue. Patient is on dialysis through a right tunneled dialysis catheter. CT scan was performed which demonstrates a large right lung mass cradling the minor fissure. IR was consulted for assessment and possible biopsy of mass. Dr. Jenny Tripp from pulmonology is on consult and is prepared to perform bronchoscopy. Reviewed bone scan images which do not demonstrate any gross abnormality. No family history on file. Past Surgical History:   Procedure Laterality Date    PORT SURGERY Right     chest for dialysis        Past Medical History:   Diagnosis Date    Dialysis patient [facility])     Port right side of chest for dialysis    GERD (gastroesophageal reflux disease)     Hypertension     Kidney disease        Social History     Socioeconomic History    Marital status:       Spouse name: Not on file    Number of children: Not on file    Years of education: Not on file    Highest education level: Not on file   Occupational History    Not on file   Social Needs    Financial resource strain: Not hard at all    Food insecurity     Worry: Never true     Inability: Never true   [employer] needs     Medical: No     Non-medical: No   Tobacco Use    Smoking status: Former Smoker     Quit date: 2020     Years since quittin.5    Smokeless tobacco: Never Used   Substance and Sexual Activity    Alcohol use: Never     Frequency: Never    Drug use: Never    Sexual activity: Not on file   Lifestyle    Physical activity     Days per week: Not on file     Minutes per session: Not on file    Stress: Not on file   Relationships    Social connections     Talks on phone: Not on file     Gets together: Not on file     Attends Rastafari service: Not on file     Active member of club or organization: Not on file     Attends meetings of clubs or organizations: Not on file Relationship status: Not on file    Intimate partner violence     Fear of current or ex partner: Not on file     Emotionally abused: Not on file     Physically abused: Not on file     Forced sexual activity: Not on file   Other Topics Concern    Not on file   Social History Narrative    Not on file       Allergies   Allergen Reactions    Ace Inhibitors     Benazepril     Ciprofloxacin     Shellfish Allergy     Sulfa Antibiotics        No current facility-administered medications on file prior to encounter. Current Outpatient Medications on File Prior to Encounter   Medication Sig Dispense Refill    metoprolol succinate (TOPROL XL) 25 MG extended release tablet Take 1 tablet by mouth daily 90 tablet 1    omeprazole (PRILOSEC) 20 MG delayed release capsule Take 1 capsule by mouth daily 90 capsule 1    Multiple Vitamins-Minerals (RENAPLEX-D) TABS TAKE 1 TABLET BY MOUTH EVERY DAY AS DIRECTED         Review of Systems   Constitutional: Positive for fatigue. Negative for chills, diaphoresis and fever. HENT: Negative. Negative for congestion, ear pain, hearing loss and tinnitus. Eyes: Negative. Negative for photophobia. Respiratory: Negative. Negative for cough, shortness of breath and wheezing. Cardiovascular: Negative. Negative for chest pain, palpitations and leg swelling. Gastrointestinal: Negative. Negative for abdominal pain, constipation, diarrhea, nausea and vomiting. Genitourinary: Negative. Negative for dysuria, flank pain, frequency, hematuria and urgency. Musculoskeletal: Negative. Negative for back pain and neck pain. Skin: Negative. Negative for rash. Allergic/Immunologic: Negative for environmental allergies. Neurological: Positive for weakness. Negative for dizziness, tremors and headaches. Hematological: Does not bruise/bleed easily. Psychiatric/Behavioral: Negative. Negative for hallucinations and suicidal ideas. The patient is not nervous/anxious. OBJECTIVE:  BP (!) 111/52   Pulse 92   Temp 98.1 °F (36.7 °C) (Oral)   Resp 18   Ht 5' 3\" (1.6 m)   Wt 135 lb 9.3 oz (61.5 kg)   SpO2 97%   BMI 24.02 kg/m²     Physical Exam  Constitutional:       General: She is not in acute distress. Appearance: She is well-developed. She is not diaphoretic. HENT:      Head: Normocephalic and atraumatic. Nose: Nose normal.      Mouth/Throat:      Pharynx: No oropharyngeal exudate. Eyes:      General: No scleral icterus. Right eye: No discharge. Left eye: No discharge. Conjunctiva/sclera: Conjunctivae normal.   Neck:      Musculoskeletal: Neck supple. Thyroid: No thyromegaly. Vascular: No JVD. Trachea: No tracheal deviation. Cardiovascular:      Rate and Rhythm: Normal rate and regular rhythm. Heart sounds: Normal heart sounds. No murmur. No friction rub. No gallop. Pulmonary:      Effort: No respiratory distress. Breath sounds: No stridor. No wheezing or rales. Chest:      Chest wall: No tenderness. Comments: Right chest tunneled HD CVC is unremarkable. Abdominal:      General: Bowel sounds are normal. There is no distension. Palpations: Abdomen is soft. There is no mass. Tenderness: There is no abdominal tenderness. There is no guarding or rebound. Hernia: No hernia is present. Musculoskeletal:         General: No tenderness or deformity. Skin:     General: Skin is dry. Coloration: Skin is not pale. Findings: No erythema or rash. Neurological:      Mental Status: She is alert and oriented to person, place, and time. Cranial Nerves: No cranial nerve deficit. Psychiatric:         Behavior: Behavior normal.         Thought Content: Thought content normal.         Judgment: Judgment normal.         ASSESSMENT ANDPLAN:    Assessment: Patient with right upper lobe lung mass on fissure. Bone scan does not demonstrate any gross abnormality for biopsy. Plan: Given the location on the fissure with a high risk of pneumothorax, it is safer to attempt via bronchoscopy. If bronchoscopy does not yield enough tissue, we can entertain CT guided biopsy with thoracic surgery backup.      Kitty Reyes MD, [facility]

## 2021-02-09 NOTE — PROGRESS NOTES
Patient refuses to turn on her side. Sacrum is red. Zinc paste applied. Heels are red but blanchable. Heels are off loaded from the bed. Mepilex on heels. Left great toe is bruised and tender.

## 2021-02-10 VITALS
OXYGEN SATURATION: 97 % | HEIGHT: 63 IN | DIASTOLIC BLOOD PRESSURE: 68 MMHG | SYSTOLIC BLOOD PRESSURE: 108 MMHG | TEMPERATURE: 97.9 F | RESPIRATION RATE: 16 BRPM | HEART RATE: 88 BPM | BODY MASS INDEX: 22.19 KG/M2 | WEIGHT: 125.22 LBS

## 2021-02-10 LAB
ALBUMIN SERPL-MCNC: 1.6 G/DL (ref 3.5–4.6)
ALP BLD-CCNC: 217 U/L (ref 40–130)
ALT SERPL-CCNC: 17 U/L (ref 0–33)
ANION GAP SERPL CALCULATED.3IONS-SCNC: 8 MEQ/L (ref 9–15)
ANISOCYTOSIS: ABNORMAL
AST SERPL-CCNC: 25 U/L (ref 0–35)
BASOPHILS ABSOLUTE: 0.1 K/UL (ref 0–0.2)
BASOPHILS RELATIVE PERCENT: 0.3 %
BILIRUB SERPL-MCNC: 0.7 MG/DL (ref 0.2–0.7)
BUN BLDV-MCNC: 11 MG/DL (ref 8–23)
CALCIUM SERPL-MCNC: 8 MG/DL (ref 8.5–9.9)
CHLORIDE BLD-SCNC: 98 MEQ/L (ref 95–107)
CO2: 24 MEQ/L (ref 20–31)
CREAT SERPL-MCNC: 3.24 MG/DL (ref 0.5–0.9)
EOSINOPHILS ABSOLUTE: 0.2 K/UL (ref 0–0.7)
EOSINOPHILS RELATIVE PERCENT: 1.1 %
GFR AFRICAN AMERICAN: 16.9
GFR NON-AFRICAN AMERICAN: 14
GLOBULIN: 3.5 G/DL (ref 2.3–3.5)
GLUCOSE BLD-MCNC: 125 MG/DL (ref 60–115)
GLUCOSE BLD-MCNC: 65 MG/DL (ref 70–99)
GLUCOSE BLD-MCNC: 98 MG/DL (ref 60–115)
HCT VFR BLD CALC: 33.6 % (ref 37–47)
HEMOGLOBIN: 10 G/DL (ref 12–16)
LYMPHOCYTES ABSOLUTE: 3.8 K/UL (ref 1–4.8)
LYMPHOCYTES RELATIVE PERCENT: 22.7 %
MACROCYTES: ABNORMAL
MCH RBC QN AUTO: 31.6 PG (ref 27–31.3)
MCHC RBC AUTO-ENTMCNC: 29.8 % (ref 33–37)
MCV RBC AUTO: 106.1 FL (ref 82–100)
MONOCYTES ABSOLUTE: 1.2 K/UL (ref 0.2–0.8)
MONOCYTES RELATIVE PERCENT: 7.2 %
NEUTROPHILS ABSOLUTE: 11.4 K/UL (ref 1.4–6.5)
NEUTROPHILS RELATIVE PERCENT: 68.7 %
PDW BLD-RTO: 17.7 % (ref 11.5–14.5)
PERFORMED ON: ABNORMAL
PERFORMED ON: NORMAL
PHOSPHORUS: 2.5 MG/DL (ref 2.3–4.8)
PLATELET # BLD: 270 K/UL (ref 130–400)
POIKILOCYTES: ABNORMAL
POTASSIUM SERPL-SCNC: 4.1 MEQ/L (ref 3.4–4.9)
RBC # BLD: 3.17 M/UL (ref 4.2–5.4)
SLIDE REVIEW: ABNORMAL
SODIUM BLD-SCNC: 130 MEQ/L (ref 135–144)
STOMATOCYTES: ABNORMAL
TOTAL PROTEIN: 5.1 G/DL (ref 6.3–8)
WBC # BLD: 16.6 K/UL (ref 4.8–10.8)

## 2021-02-10 PROCEDURE — 6370000000 HC RX 637 (ALT 250 FOR IP): Performed by: ANESTHESIOLOGY

## 2021-02-10 PROCEDURE — 6370000000 HC RX 637 (ALT 250 FOR IP): Performed by: INTERNAL MEDICINE

## 2021-02-10 PROCEDURE — 85025 COMPLETE CBC W/AUTO DIFF WBC: CPT

## 2021-02-10 PROCEDURE — 6360000002 HC RX W HCPCS: Performed by: INTERNAL MEDICINE

## 2021-02-10 PROCEDURE — 36415 COLL VENOUS BLD VENIPUNCTURE: CPT

## 2021-02-10 PROCEDURE — 80053 COMPREHEN METABOLIC PANEL: CPT

## 2021-02-10 PROCEDURE — 84100 ASSAY OF PHOSPHORUS: CPT

## 2021-02-10 PROCEDURE — 97116 GAIT TRAINING THERAPY: CPT

## 2021-02-10 PROCEDURE — 99232 SBSQ HOSP IP/OBS MODERATE 35: CPT | Performed by: INTERNAL MEDICINE

## 2021-02-10 RX ADMIN — TRAMADOL HYDROCHLORIDE 50 MG: 50 TABLET, FILM COATED ORAL at 05:39

## 2021-02-10 RX ADMIN — HEPARIN SODIUM 5000 UNITS: 5000 INJECTION INTRAVENOUS; SUBCUTANEOUS at 05:36

## 2021-02-10 RX ADMIN — TRAMADOL HYDROCHLORIDE 50 MG: 50 TABLET, FILM COATED ORAL at 21:52

## 2021-02-10 RX ADMIN — TRAMADOL HYDROCHLORIDE 50 MG: 50 TABLET, FILM COATED ORAL at 11:42

## 2021-02-10 RX ADMIN — PANTOPRAZOLE SODIUM 40 MG: 40 TABLET, DELAYED RELEASE ORAL at 05:36

## 2021-02-10 ASSESSMENT — ENCOUNTER SYMPTOMS
VOMITING: 0
SHORTNESS OF BREATH: 0
COUGH: 0
NAUSEA: 0
DIARRHEA: 0

## 2021-02-10 ASSESSMENT — PAIN SCALES - GENERAL
PAINLEVEL_OUTOF10: 0
PAINLEVEL_OUTOF10: 0

## 2021-02-10 NOTE — PROGRESS NOTES
Pt alert and oriented X4, calm and cooperative. Lung sounds clear, heart sounds normal, bowel sounds active in all four quadrants. Abdomen is soft and non tender. Pt medicated with PRN tramadol, see emar. Pt refusing to turn on sides, education provided pt still refusing.  Electronically signed by Nini Camacho RN on 2/10/2021 at 6:18 AM

## 2021-02-10 NOTE — PROGRESS NOTES
Hematology/Oncology   Progress Note        CHIEF COMPLAINT/HPI:  Follow up of lung mass. CEA is elevated at 67. High suspicion of malignancy. Patient has declined bronchoscopy and biopsy. REVIEW OF SYSTEMS:    Unremarkable except for symptoms mentioned in HPI.     Current Inpatient Medications:    Current Facility-Administered Medications   Medication Dose Route Frequency Provider Last Rate Last Admin    lidocaine 4 % external patch 3 patch  3 patch Transdermal Daily Torri Rinaldi MD   3 patch at 02/10/21 0937    metaxalone (SKELAXIN) tablet 400 mg  400 mg Oral TID PRN Torri Rinaldi MD        lactated ringers infusion   Intravenous Continuous Ahsan Trimble MD   Stopped at 02/05/21 2021    sodium chloride flush 0.9 % injection 10 mL  10 mL Intravenous 2 times per day Clarence Barrera MD   10 mL at 02/09/21 0850    sodium chloride flush 0.9 % injection 10 mL  10 mL Intravenous PRN Clarence Barrera MD   10 mL at 02/08/21 0901    heparin (porcine) injection 5,000 Units  5,000 Units Subcutaneous 3 times per day Clarence Barrera MD   5,000 Units at 02/10/21 0536    promethazine (PHENERGAN) tablet 12.5 mg  12.5 mg Oral Q6H PRN Clarence Barrera MD        Or    ondansetron [facility]) injection 4 mg  4 mg Intravenous Q6H PRN Clarence Barrera MD        polyethylene glycol (GLYCOLAX) packet 17 g  17 g Oral Daily PRN Clarence Barrera MD        acetaminophen (TYLENOL) tablet 650 mg  650 mg Oral Q6H PRN Clarence Barrera MD   650 mg at 02/04/21 0557    heparin (porcine) injection 2,000 Units  2,000 Units Intravenous Once Trudy Tamez DO        pantoprazole (PROTONIX) tablet 40 mg  40 mg Oral QAM ILENE LARSON Sedar, DO   40 mg at 02/10/21 0536    traMADol (ULTRAM) tablet 50 mg  50 mg Oral Q6H PRN Sophia Mac Sedar, DO   50 mg at 02/10/21 0539       PHYSICAL EXAM:    EYES:  Lids and lashes normal, pupils equal, round and reactive to light, extra ocular muscles intact, sclera clear, conjunctiva normal ENT:  Normocephalic, without obvious abnormality, atraumatic, sinuses nontender on palpation, external ears without lesions, oral pharynx with moist mucus membranes, tonsils without erythema or exudates, gums normal and good dentition. NECK:  Supple, symmetrical, trachea midline, no adenopathy, thyroid symmetric, not enlarged and no tenderness, skin normal    CHEST:    LUNGS:  No increased work of breathing, good air exchange, clear to auscultation bilaterally, no crackles or wheezing    CARDIOVASCULAR:  Normal apical impulse, regular rate and rhythm, normal S1 and S2, no S3 or S4, and no murmur noted    ABDOMEN:  No scars, normal bowel sounds, soft, non-distended, non-tender, no masses palpated, no hepatosplenomegally    MUSCULOSKELETAL:  There is no redness, warmth, or swelling of the joints. Full range of motion noted. Motor strength is 5 out of 5 all extremities bilaterally.   Tone is normal.  EXTREMITIES:    NEURO:    DATA:      PT/INR:  No results found for: PTINR  PTT:  No results found for: APTT  CMP:    Lab Results   Component Value Date     02/10/2021    K 4.1 02/10/2021    CL 98 02/10/2021    CO2 24 02/10/2021    BUN 11 02/10/2021    PROT 5.1 02/10/2021     Magnesium:  No results found for: MG  Phosphorus:  No components found for: PO4  Calcium:  No components found for: CA  CBC:    Lab Results   Component Value Date    WBC 16.6 02/10/2021    RBC 3.17 02/10/2021    HGB 10.0 02/10/2021    HCT 33.6 02/10/2021    .1 02/10/2021    RDW 17.7 02/10/2021     02/10/2021     DIFF:    Lab Results   Component Value Date    .1 02/10/2021    RDW 17.7 02/10/2021      LDH:  No results found for: LDH  Uric Acid:  No components found for: URIC    EKG Reviewed  Appropriate Radiology Reviewed      Pathology: Reviewed where indicated      ASSESSMENT:  Principal Problem:    General weakness  Active Problems:    Acute pain of left shoulder    Sprain of left shoulder    Neck pain Chronic bilateral low back pain without sciatica    Abnormality of gait    ESRD (end stage renal disease) on dialysis (HCC)    Muscle tenderness    Lytic lesion of bone on x-ray    Mass of upper lobe of right lung    ESRD (end stage renal disease) ([facility] [address]  Resolved Problems:    * No resolved hospital problems. *    Patient Active Problem List   Diagnosis    Gastrointestinal hemorrhage associated with duodenal ulcer    Anemia due to acute blood loss    Chronic obstructive pulmonary disease (HCC)    CKD (chronic kidney disease) stage 3, GFR 30-59 ml/min    Essential hypertension    General weakness    Acute pain of left shoulder    Sprain of left shoulder    Neck pain    Chronic bilateral low back pain without sciatica    Abnormality of gait    ESRD (end stage renal disease) on dialysis (HCC)    Muscle tenderness    Lytic lesion of bone on x-ray    Mass of upper lobe of right lung    ESRD (end stage renal disease) ([facility] [address]       PLAN:  High suspicion of malignancy. Declined further work up.          Electronically signed by Hansel Solitario MD on 2/10/21 at 10:56 AM EST

## 2021-02-10 NOTE — PROGRESS NOTES
Physical Therapy Med Surg Daily Treatment Note  Facility/Department: [facility]  Room: H964/F045-93       NAME: Mariposa Begum  : 1950 (89 y.o.)  MRN: [id number]  CODE STATUS: Full Code    Date of Service: 2/10/2021    Patient Diagnosis(es): General weakness [R53.1]  ESRD (end stage renal disease) ([facility] [address] [N18.6]   No chief complaint on file. Patient Active Problem List    Diagnosis Date Noted    ESRD (end stage renal disease) ([facility] [address] 2021    Acute pain of left shoulder 2021    Sprain of left shoulder 2021    Neck pain 2021    Chronic bilateral low back pain without sciatica 2021    Abnormality of gait 2021    ESRD (end stage renal disease) on dialysis ([facility] [address] 2021    Muscle tenderness 2021    Lytic lesion of bone on x-ray 2021    Mass of upper lobe of right lung 2021    General weakness 2021    Gastrointestinal hemorrhage associated with duodenal ulcer 2020    Anemia due to acute blood loss 2020    Chronic obstructive pulmonary disease ([facility] [address] 2020    CKD (chronic kidney disease) stage 3, GFR 30-59 ml/min 2020    Essential hypertension 2020        Past Medical History:   Diagnosis Date    Dialysis patient [facility])     Port right side of chest for dialysis    GERD (gastroesophageal reflux disease)     Hypertension     Kidney disease      Past Surgical History:   Procedure Laterality Date    PORT SURGERY Right     chest for dialysis          Restrictions  Restrictions/Precautions: Fall Risk(high rincon score)    SUBJECTIVE   Subjective  Subjective: I'm going to rehab today. I didn't think I had to do this.     Pre-Session Pain Report  Pre Treatment Pain Screening  Pain at present: 0  Intervention List: Patient able to continue with treatment          Post-Session Pain Report  Pain Assessment  Pain Level: 0         OBJECTIVE        Bed mobility  Rolling to Left: Independent

## 2021-02-10 NOTE — DISCHARGE INSTR - COC
Continuity of Care Form    Patient Name: Vamsi Weinberg   :  1950  MRN:  [id number]    Admit date:  2021  Discharge date:  2/10/21    Code Status Order: Full Code   Advance Directives:   [facility] Documentation       Date/Time Healthcare Directive Type of Healthcare Directive Copy in [facility] [address] Agent's Name Healthcare Agent's Phone Number    21 [phone]  No, patient does not have an advance directive for healthcare treatment -- -- -- -- --            Admitting Physician:  Lizzy Sawyer MD  PCP: Shanelle Evangelista MD    Discharging Nurse: Diana Aguila Unit/Room#: O483/S731-61  Discharging Unit Phone Number: [phone]    Emergency Contact:   Extended Emergency Contact Information  Primary Emergency Contact: [facility] hospital  Home Phone: [phone]  Mobile Phone: [phone]  Relation: Child    Past Surgical History:  Past Surgical History:   Procedure Laterality Date    PORT SURGERY Right     chest for dialysis       Immunization History: There is no immunization history on file for this patient.     Active Problems:  Patient Active Problem List   Diagnosis Code    Gastrointestinal hemorrhage associated with duodenal ulcer K26.4    Anemia due to acute blood loss D62    Chronic obstructive pulmonary disease (HCC) J44.9    CKD (chronic kidney disease) stage 3, GFR 30-59 ml/min N18.30    Essential hypertension I10    General weakness R53.1    Acute pain of left shoulder M25.512    Sprain of left shoulder S43.402A    Neck pain M54.2    Chronic bilateral low back pain without sciatica M54.5, G89.29    Abnormality of gait R26.9    ESRD (end stage renal disease) on dialysis ([facility] N18.6, Z99.2    Muscle tenderness M79.10    Lytic lesion of bone on x-ray M89.9    Mass of upper lobe of right lung R91.8    ESRD (end stage renal disease) ([facility] N18.6       Isolation/Infection:   Isolation            No Isolation Patient Infection Status       Infection Onset Added Last Indicated Last Indicated By Review Planned Expiration Resolved Resolved By    None active    Resolved    COVID-19 Rule Out 02/02/21 02/02/21 02/02/21 COVID-19 (Ordered)   02/02/21 Rule-Out Test Resulted            Nurse Assessment:  Last Vital Signs: BP (!) 105/54   Pulse 91   Temp 97.5 °F (36.4 °C) (Oral)   Resp 18   Ht 5' 3\" (1.6 m)   Wt 135 lb 9.3 oz (61.5 kg)   SpO2 97%   BMI 24.02 kg/m²     Last documented pain score (0-10 scale): Pain Level: 8  Last Weight:   Wt Readings from Last 1 Encounters:   02/02/21 134 lb (60.8 kg)     Mental Status:  oriented and alert    IV Access:  - None    Nursing Mobility/ADLs:  Walking   Assisted  Transfer  Assisted  Bathing  Assisted  Dressing  Assisted  Toileting  Assisted  Feeding  Assisted  Med Admin  Assisted  Med Delivery   whole    Wound Care Documentation and Therapy:        Elimination:  Continence:   · Bowel: Yes  · Bladder: Yes  Urinary Catheter: None   Colostomy/Ileostomy/Ileal Conduit: No       Date of Last BM: 2/9/21    Intake/Output Summary (Last 24 hours) at 2/10/2021 1653  Last data filed at 2/9/2021 1749  Gross per 24 hour   Intake 120 ml   Output    Net 120 ml     I/O last 3 completed shifts: In: 120 [P.O.:120]  Out: -     Safety Concerns: At Risk for Falls    Impairments/Disabilities:      None    Nutrition Therapy:  Current Nutrition Therapy:   - Oral Diet:  General and Low Sodium (3-4gm)    Routes of Feeding: Oral  Liquids: Thin Liquids  Daily Fluid Restriction: no  Last Modified Barium Swallow with Video (Video Swallowing Test): not done    Treatments at the Time of Hospital Discharge:   Respiratory Treatments: ***  Oxygen Therapy:  is not on home oxygen therapy.   Ventilator:    - No ventilator support    Rehab Therapies: Physical Therapy and Occupational Therapy  Weight Bearing Status/Restrictions: No weight bearing restirctions Other Medical Equipment (for information only, NOT a DME order):  walker  Other Treatments: ***    Patient's personal belongings (please select all that are sent with patient):  {CHP DME Belongings:073002539:::0}    RN SIGNATURE:  Electronically signed by Sangeeta Arvizu RN on 2/10/21 at 8:05 PM EST    CASE MANAGEMENT/SOCIAL WORK SECTION    Inpatient Status Date: ***    Readmission Risk Assessment Score:  Readmission Risk              Risk of Unplanned Readmission:        14           Discharging to Facility/ Agency   · Name:   · Address:  · Phone:  · Fax:    Dialysis Facility (if applicable)   · Name:  · Address:  · Dialysis Schedule:  · Phone:  · Fax:    / signature: {Esignature:[id number]:::0}    PHYSICIAN SECTION    Prognosis: {Prognosis:4003537436:::0}    Condition at Discharge: Cas Diehl Patient Condition:156358789:::0}    Rehab Potential (if transferring to Rehab): {Prognosis:0545310542:::0}    Recommended Labs or Other Treatments After Discharge: ***    Physician Certification: I certify the above information and transfer of Judge Simons  is necessary for the continuing treatment of the diagnosis listed and that she requires {Admit to Appropriate Level of Care:67548:::0} for {GREATER/LESS:019154123} 30 days.      Update Admission H&P: {CHP DME Changes in HandP:186804982:::0}    PHYSICIAN SIGNATURE:  Electronically signed by Qian Qureshi MD on 2/10/21 at 4:53 PM EST

## 2021-02-10 NOTE — CARE COORDINATION
Peer to peer information has been given to Dr. Raquel Choudhury for [place].   It must be completed by 9am on 2/11.    [phone]  Option 5  Ref# [id number]

## 2021-02-10 NOTE — PROGRESS NOTES
INPATIENT PROGRESS NOTES    PATIENT NAME: Yana Doan  MRN: [id number]  SERVICE DATE:  February 10, 2021   SERVICE TIME:  11:57 AM      PRIMARY SERVICE: Pulmonary Disease    CHIEF COMPLAIN: Lung mass      INTERVAL HPI: Patient seen and examined at bedside, Interval Notes, orders reviewed. Nursing notes noted  Patient denies having shortness of breath. No chest pain. No fever. No nausea, vomiting or diarrhea. Patient says she does not want to have a biopsy of lung mass. Bone scan was negative. She is going to nursing home today    OBJECTIVE    Body mass index is 24.02 kg/m². PHYSICAL EXAM:  Vitals:  BP (!) 105/54   Pulse 91   Temp 97.5 °F (36.4 °C) (Oral)   Resp 18   Ht 5' 3\" (1.6 m)   Wt 135 lb 9.3 oz (61.5 kg)   SpO2 97%   BMI 24.02 kg/m²   General: Alert, awake . comfortable in bed, No distress. Head: Atraumatic , Normocephalic   Eyes: PERRL. No sclera icterus. No conjunctival injection. No discharge   ENT: No nasal  discharge. Pharynx clear. lips, teeth, mucosa and gums are normal, tongue protrudes in the midline  Neck:  Trachea midline. No thyromegaly, no JVD, No cervical adenopathy. Chest : Bilaterally symmetrical ,Normal effort,  No accessory muscle use  Lung : . Fair BS bilateral, decreased BS at bases. No Rales. No wheezing. No rhonchi. Heart[de-identified] Normal  rate. Regular rhythm. No mumur ,  Rub or gallop  ABD: Non-tender. Non-distended. No masses. No organmegaly. Normal bowel sounds. No hernia.   Ext : No Pitting both leg , No Cyanosis No clubbing  Neuro: no focal weakness          DATA:   Recent Labs     02/09/21  0546 02/10/21  0528   WBC 17.2* 16.6*   HGB 9.4* 10.0*   HCT 30.2* 33.6*   .0* 106.1*    270     Recent Labs     02/09/21  0546 02/10/21  0528   * 130*   K 4.0 4.1   CL 97 98   CO2 28 24   BUN 7* 11   CREATININE 2.02* 3.24*   GLUCOSE 71 65*   CALCIUM 7.8* 8.0*   PROT 5.1* 5.1*   LABALBU 1.8* 1.6*   BILITOT 0.5 0.7   ALKPHOS 214* 217*   AST 25 25   ALT 20 17 LABGLOM 24.1* 14.0*   GFRAA 29.2* 16.9*   GLOB 3.3 3.5       MV Settings:          No results for input(s): PHART, OZA6KFH, PO2ART, HNZ3KQF, BEART, C0YQLUCJ in the last 72 hours.     O2 Device: None (Room air)  O2 Flow Rate (L/min): 0 L/min    DIET GENERAL; No Added Salt (3-4 GM)  Dietary Nutrition Supplements: Frozen Oral Supplement     MEDICATIONS during current hospitalization:    Continuous Infusions:   lactated ringers Stopped (02/05/21 2021)       Scheduled Meds:   lidocaine  3 patch Transdermal Daily    sodium chloride flush  10 mL Intravenous 2 times per day    heparin (porcine)  5,000 Units Subcutaneous 3 times per day    heparin (porcine)  2,000 Units Intravenous Once    pantoprazole  40 mg Oral QAM AC       PRN Meds:metaxalone, sodium chloride flush, promethazine **OR** ondansetron, polyethylene glycol, acetaminophen **OR** [DISCONTINUED] acetaminophen, traMADol    Radiology  Ct Abdomen Pelvis Wo Contrast Additional Contrast? None    Result Date: 2/6/2021 CT of the Abdomen and Pelvis without intravenous contrast medium History:  Abnormal CT Technical Factors: CT imaging of the abdomen and pelvis were obtained and formatted as 5 mm contiguous axial images from the domes of the diaphragm to the symphysis pubis. Sagittal and coronal reconstructions were also obtained. Oral contrast medium: Water as oral contrast medium. Intravenous contrast medium: None. Comparison: Findings: Lungs:  Bilateral pleural effusions and consolidation/atelectasis are seen in both bases. Liver:  Normal in size, shape, and attenuation. Bile Ducts:  Normal in caliber. Gallbladder: Distended with stones layering in the gallbladder. Pancreas: Homogeneous parenchymal enhancement. No necrosis identified. No intrahepatic fluid collections, cystic or solid lesions, pancreatic ductal dilatation, or calcification. Spleen:  Normal in size without masses or calcifications. No splenules. Kidneys:  Small with cortical thinning. There is no evidence for hydronephrosis or renal calculus Adrenals:  Normal. Small bowel:  Normal in caliber. Appendix:  Normal. Colon: Diverticulosis coli without evidence for diverticulitis. Peritoneum:  Small to moderate amount of free fluid in the posterior pelvis. Surgical clips are seen in the upper abdomen. Vessels: At the level of the renal veins the abdominal aorta measures 3.5 cm in greatest transverse diameter and 3 cm anterior posterior. Below the renal veins the abdominal aorta measures 5.5 transverse by 5.6 cm AP. Right above the bifurcation the aneurysm measures 3.9 transverse by 3.8 cm. The aneurysm measures 13 cm craniocaudad. There are diffuse atherosclerotic calcifications visualized. Lymph nodes: No retroperitoneal lymph node enlargement. Bladder: Distended normally Abdominal Wall: Edematous change is in the subcutaneous fat over the flanks. A tiny umbilical hernia. Bones: No destructive bony lesions are seen. 1. Large abdominal aortic aneurysm is seen as described. 2. No evidence for bowel obstruction, diverticulitis or appendicitis. There is evidence of diverticulosis coli. Also free fluid is seen in the posterior pelvis 3. Cholelithiasis 4. Small to moderate bilateral pleural effusions and bilateral infiltrates versus atelectasis All CT scans at this facility use dose modulation, iterative reconstruction, and/or weight based dosing when appropriate to reduce radiation dose to as low as reasonably achievable. 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 FINDINGS: Four views of the left shoulder are submitted. There is diffuse generalized osteopenia. No acute fractures. No dislocations. No focal bony abnormalities                                                                                   NO ACUTE FRACTURES CONSIDER WHOLE BODY BONE SCAN TO FURTHER EVALUATE EXAMINATION: RIGHT HIP  CLINICAL HISTORY: Pain. COMPARISON: None  FINDINGS: Two views are  submitted. No acute fractures. No dislocations. No focal bony abnormalities                                                                               IMPRESSION: NO ACUTE FRACTURE.  CONSIDER WHOLE BODY BONE SCAN TO FURTHER EVALUATE    Ct Head Wo Contrast    Result Date: 2/2/2021 CT HEAD WO CONTRAST : 2/2/2021 CLINICAL HISTORY:  weakness . COMPARISON: None available. TECHNIQUE: Spiral unenhanced images were obtained of the head, with routine multiplanar reconstructions performed. All CT scans at this facility use dose modulation, iterative reconstruction, and/or weight based dosing when appropriate to reduce radiation dose to as low as reasonably achievable. FINDINGS: Destructive lytic lesions with surrounding soft tissue density are present within the posterior right parietal bone, at the vertex, the left lateral orbital wall, and to a lesser extent elsewhere, which are suspicious for metastatic disease. The largest within the right posterior parietal calvarium measures approximately 2.5 cm. There is no intracranial hemorrhage, mass effect, midline shift, extra-axial collection, evidence of hydrocephalus, recent ischemic infarct, or displaced pathologic fracture identified. Mild generalized cerebral volume loss is present, with mild patchy supratentorial white matter changes most consistent with chronic small vessel ischemic disease. The mastoid air cells and visualized paranasal sinuses are essentially clear. SUSPICIOUS LYTIC CALVARIAL LESIONS FOR METASTATIC DISEASE, AS NOTED. OTHER POSSIBILITIES ARE THOUGHT TO BE LESS LIKELY. FURTHER EVALUATION IS SUGGESTED. NO ACUTE INTRACRANIAL HEMORRHAGE OR COMPLICATION IDENTIFIED.      Ct Chest Wo Contrast    Result Date: 2/5/2021 CT of the Chest without intravenous contrast medium. History:  Lung nodule. Follow-up. Nodule identified, in 2018. Also, history of multiple lytic bone lesions in skull and cervical spine. End-stage renal disease. Receiving hemodialysis. Technical Factors: CT imaging of the chest was obtained and formatted as 5 mm contiguous axial images from the thoracic inlet through the adrenal glands. Sagittal and coronal reconstruction obtained during postprocessing. Intravenous contrast medium:  None Comparison:  None Findings: On the right side, a noncalcified nonpleural-based, partially stellate shaped 2.2 x 2.1 x 2.4 cm mass is visualized within the right upper lobe (series 2, image 23, series 601, image 25, series 602, image 21). Strand-like extension from the mass extending to the pleural surface. Dependent subsegmental atelectatic change versus fluid, dependent portion right middle lobe at level of major fissure. Small to moderate right pleural effusion. Masslike consolidation extending from right infrahilar region, caudally into the right lower lobe, with calcification identified. 6 mm calcification identified within right lower lobe. On the left side, small to moderate left pleural effusion with consolidation, left lower lobe. No nodules and no masses. Cardiac size normal. No pericardial effusion. Thoracic aorta normal in course and caliber. Multilumen jugular vein central line extending into right ventricle. Limited imaging upper abdomen shows 2 radiopacities measuring up to 2.7 mm just anterior to distal greater curvature (series 2, image 59). No osteoblastic, and no osteolytic lesions. 2.2 x 2.1 x 2.4 cm right upper lobe mass. Malignancy diagnosis of exclusion. Bilateral small to moderate pleural effusions with bilateral lower lobe atelectasis/pneumonia. Other findings discussed. All CT scans at this facility use dose modulation, iterative reconstruction, and/or weight based dosing when appropriate to reduce radiation dose to as low as reasonably achievable. 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 FINDINGS: Four views of the left shoulder are submitted. There is diffuse generalized osteopenia. No acute fractures. No dislocations. No focal bony abnormalities                                                                                   NO ACUTE FRACTURES CONSIDER WHOLE BODY BONE SCAN TO FURTHER EVALUATE EXAMINATION: RIGHT HIP  CLINICAL HISTORY: Pain. COMPARISON: None  FINDINGS: Two views are  submitted. No acute fractures. No dislocations. No focal bony abnormalities                                                                               IMPRESSION: NO ACUTE FRACTURE. CONSIDER WHOLE BODY BONE SCAN TO FURTHER EVALUATE    Xr Chest Portable    Result Date: 2/2/2021  Exam: XR CHEST PORTABLE History:  shoulder pain weakness Technique: AP portable view of the chest obtained. Comparison: None Chest x-ray portable Findings: There is a right internal jugular dialysis catheter in place . The cardiomediastinal silhouette is within normal limits. There are no infiltrates, consolidations or effusions. There is mild blunting of the left costophrenic recesses which may represent a small pleural effusion. Bones of the thorax appear intact. No acute cardiopulmonary changes. Mild blunting of the costophrenic recess on the left may be due to a small effusion versus prior scarring.        IMPRESSION AND SUGGESTION: 1. Right upper lung 2.2 x 2.1 x 2.4 cm lung mass, refused work-up  2. Large abdominal aortic aneurysm  3. Chronic kidney disease on hemodialysis    Bone scan was negative. She refused to have biopsy of lung mass. She has elevated CEA 67.5. Also she does not want to pursue any further work-up regarding abdominal aortic aneurysm. She is on hemodialysis. Continue present treatment plan.   She is going to nursing home today    Electronically signed by Oswaldo Pyle MD, [facility] on 2/10/2021 at 11:57 AM

## 2021-02-10 NOTE — PROGRESS NOTES
Progress Note  Date:2/10/2021       [facility]  Patient Name:Caroline Miller     YOB: 1950     Age:71 y.o. Subjective    Subjective:  Symptoms:  She reports weakness. No shortness of breath, malaise, cough, chest pain, headache, chest pressure, anorexia, diarrhea or anxiety. Diet:  No nausea or vomiting. Review of Systems   Respiratory: Negative for cough and shortness of breath. Cardiovascular: Negative for chest pain. Gastrointestinal: Negative for anorexia, diarrhea, nausea and vomiting. Neurological: Positive for weakness. Objective         Vitals Last 24 Hours:  TEMPERATURE:  Temp  Av.5 °F (36.4 °C)  Min: 97.5 °F (36.4 °C)  Max: 97.5 °F (36.4 °C)  RESPIRATIONS RANGE: Resp  Av  Min: 18  Max: 18  PULSE OXIMETRY RANGE: SpO2  Av %  Min: 97 %  Max: 97 %  PULSE RANGE: Pulse  Av  Min: 91  Max: 91  BLOOD PRESSURE RANGE: Systolic (45VEC), EII:094 , Min:105 , BK   ; Diastolic (18EUE), WKV:22, Min:54, Max:54    I/O (24Hr): Intake/Output Summary (Last 24 hours) at 2/10/2021 1055  Last data filed at 2021 1749  Gross per 24 hour   Intake 360 ml   Output    Net 360 ml     Objective:  General Appearance:  Comfortable, well-appearing and in no acute distress. Vital signs: (most recent): Blood pressure (!) 105/54, pulse 91, temperature 97.5 °F (36.4 °C), temperature source Oral, resp. rate 18, height 5' 3\" (1.6 m), weight 135 lb 9.3 oz (61.5 kg), SpO2 97 %, not currently breastfeeding. HEENT: Normal HEENT exam.    Lungs:  Normal effort. Heart: Normal rate. Regular rhythm. S1 normal and S2 normal.    Abdomen: Abdomen is soft. There is no epigastric area tenderness. Extremities: There is dependent edema. Pulses: Distal pulses are intact. Neurological: Patient is alert. Pupils:  Pupils are equal, round, and reactive to light. Skin:  Warm.       Labs/Imaging/Diagnostics    Labs:  CBC:  Recent Labs     21 Electronically signed by Dg Elliott MD on 2/8/21 at 2:32 PM EST

## 2021-02-10 NOTE — PROGRESS NOTES
Nephrology Progress Note    Assessment:  ESRDX IHD today  Right lung mass mets  Bone scan negativ e  Anemia  COPD      Plan: dialysis today  Refuses further work-up ie biopsy   Send to NH  Wishes to continue dialysis    Patient Active Problem List:     Gastrointestinal hemorrhage associated with duodenal ulcer     Anemia due to acute blood loss     Chronic obstructive pulmonary disease (HCC)     CKD (chronic kidney disease) stage 3, GFR 30-59 ml/min     Essential hypertension     General weakness     Acute pain of left shoulder     Sprain of left shoulder     Neck pain     Chronic bilateral low back pain without sciatica     Abnormality of gait     ESRD (end stage renal disease) on dialysis (HCC)     Muscle tenderness     Lytic lesion of bone on x-ray     Mass of upper lobe of right lung     ESRD (end stage renal disease) ([facility] [address]      Subjective:  Admit Date: 2/2/2021    Interval History: feeling well     Medications:  Scheduled Meds:   lidocaine  3 patch Transdermal Daily    sodium chloride flush  10 mL Intravenous 2 times per day    heparin (porcine)  5,000 Units Subcutaneous 3 times per day    heparin (porcine)  2,000 Units Intravenous Once    pantoprazole  40 mg Oral QAM AC     Continuous Infusions:   lactated ringers Stopped (02/05/21 2021)       CBC:   Recent Labs     02/09/21  0546 02/10/21  0528   WBC 17.2* 16.6*   HGB 9.4* 10.0*    270     CMP:    Recent Labs     02/08/21  0515 02/09/21  0546 02/10/21  0528   * 133* 130*   K 4.5 4.0 4.1   CL 96 97 98   CO2 26 28 24   BUN 10 7* 11   CREATININE 3.62* 2.02* 3.24*   GLUCOSE 101* 71 65*   CALCIUM 8.1* 7.8* 8.0*   LABGLOM 12.3* 24.1* 14.0*     Troponin: No results for input(s): TROPONINI in the last 72 hours. BNP: No results for input(s): BNP in the last 72 hours. INR: No results for input(s): INR in the last 72 hours. Lipids: No results for input(s): CHOL, LDLDIRECT, TRIG, HDL, AMYLASE, LIPASE in the last 72 hours.   Liver:   Recent Labs 02/10/21  0528   AST 25   ALT 17   ALKPHOS 217*   PROT 5.1*   LABALBU 1.6*   BILITOT 0.7     Iron:  No results for input(s): IRONS, FERRITIN in the last 72 hours. Invalid input(s): LABIRONS  Urinalysis: No results for input(s): UA in the last 72 hours.     Objective:  Vitals: BP (!) 105/54   Pulse 91   Temp 97.5 °F (36.4 °C) (Oral)   Resp 18   Ht 5' 3\" (1.6 m)   Wt 135 lb 9.3 oz (61.5 kg)   SpO2 97%   BMI 24.02 kg/m²    Wt Readings from Last 3 Encounters:   02/02/21 134 lb (60.8 kg)   01/11/21 135 lb (61.2 kg)   09/16/20 149 lb (67.6 kg)      24HR INTAKE/OUTPUT:      Intake/Output Summary (Last 24 hours) at 2/10/2021 5360  Last data filed at 2/9/2021 1749  Gross per 24 hour   Intake 360 ml   Output    Net 360 ml       General: alert, in no apparent distress  HEENT: normocephalic, atraumatic, anicteric  Neck: supple, no mass  ctheter in place for dialysis   Lungs: non-labored respirations, clear to auscultation bilaterally  Heart: regular rate and rhythm, no murmurs or rubs  Abdomen: soft, non-tender, non-distended  Ext: no cyanosis, no peripheral edema  Neuro: alert and oriented, no gross abnormalities  Psych: normal mood and affect  Skin: no rash      Electronically signed by Petey Blackwell MD

## 2021-02-11 DIAGNOSIS — M15.9 OSTEOARTHRITIS OF MULTIPLE JOINTS, UNSPECIFIED OSTEOARTHRITIS TYPE: Primary | ICD-10-CM

## 2021-02-11 RX ORDER — TRAMADOL HYDROCHLORIDE 50 MG/1
50 TABLET ORAL EVERY 6 HOURS PRN
COMMUNITY
End: 2021-02-11 | Stop reason: SDUPTHER

## 2021-02-11 NOTE — PLAN OF CARE
Problem: Pain:  Goal: Pain level will decrease  Description: Pain level will decrease  Outcome: Completed  Goal: Control of acute pain  Description: Control of acute pain  Outcome: Completed  Goal: Control of chronic pain  Description: Control of chronic pain  Outcome: Completed     Problem: Skin Integrity:  Goal: Will show no infection signs and symptoms  Description: Will show no infection signs and symptoms  Outcome: Completed  Goal: Absence of new skin breakdown  Description: Absence of new skin breakdown  Outcome: Completed  Goal: Status of oral mucous membranes will improve  Description: Status of oral mucous membranes will improve  Outcome: Completed  Goal: Skin integrity will be maintained  Description: Skin integrity will be maintained  Outcome: Completed     Problem: Falls - Risk of:  Goal: Will remain free from falls  Description: Will remain free from falls  Outcome: Completed  Goal: Absence of physical injury  Description: Absence of physical injury  Outcome: Completed     Problem: IP DRESSINGS LOWER EXTREMITIES  Goal: LTG - patient will dress lower body with or without assistive device  Outcome: Completed     Problem: IP DRESSING UPPER EXTREMITIES  Goal: LTG - Patient will dress upper body from seated position  Outcome: Completed     Problem: IP BALANCE  Goal: LTG - patient will maintain standing balance to allow for completion of daily activities  Outcome: Completed     Problem: Nutrition  Goal: Optimal nutrition therapy  Outcome: Completed     Problem: IP SWALLOWING  Goal: LTG - patient will tolerate the least restrictive diet consistency to allow for safe consumption of daily meals  Outcome: Completed     Problem:  Activity:  Goal: Fatigue will decrease  Description: Fatigue will decrease  Outcome: Completed  Goal: Risk for activity intolerance will decrease  Description: Risk for activity intolerance will decrease  Outcome: Completed     Problem: Coping:  Goal: Ability to cope will improve Description: Ability to cope will improve  Outcome: Completed     Problem: Fluid Volume:  Goal: Will show no signs or symptoms of fluid imbalance  Description: Will show no signs or symptoms of fluid imbalance  Outcome: Completed     Problem: Health Behavior:  Goal: Ability to manage health-related needs will improve  Description: Ability to manage health-related needs will improve  Outcome: Completed  Goal: Identification of resources available to assist in meeting health care needs will improve  Description: Identification of resources available to assist in meeting health care needs will improve  Outcome: Completed     Problem: Nutritional:  Goal: Ability to identify appropriate dietary choices will improve  Description: Ability to identify appropriate dietary choices will improve  Outcome: Completed     Problem: Physical Regulation:  Goal: Ability to maintain clinical measurements within normal limits will improve  Description: Ability to maintain clinical measurements within normal limits will improve  Outcome: Completed  Goal: Complications related to the disease process, condition or treatment will be avoided or minimized  Description: Complications related to the disease process, condition or treatment will be avoided or minimized  Outcome: Completed     Problem: Sensory:  Goal: General experience of comfort will improve  Description: General experience of comfort will improve  Outcome: Completed

## 2021-02-11 NOTE — PROGRESS NOTES
Discharge paperwork discussed with patient, pt states she has no questions. [address] to call report on patient, no one answered. RN tried every extension. Will try again soon Electronically signed by Trinh Del Rosario RN on 2/10/2021 at 8:43 PM      2220: Called report to [facility]. 2350: EMS here to  patient, IV removed, tele removed, cleaned and sent to monitor room. Pt belongings gathetered and sent with the patient. [facility] called to let them know patient is on her way.  Electronically signed by Trinh Del Rosario RN on 2/10/2021 at 11:07 PM

## 2021-02-11 NOTE — PROGRESS NOTES
Dialysis note    2000 mLs removed during Hd, tolerated well, no adverse events noted. Blood was returned post HD, lines heparin locked and capped.

## 2021-02-11 NOTE — PROGRESS NOTES
PROGRESS NOTE -PAIN MANAGEMENT     SERVICE DATE:  2/10/2021   SERVICE TIME:  8:19 PM    CHIEFCOMPLAINT: Left shoulder pain, generalized neck and back pain      SUBJECTIVE:  Ms. Ambrocio Craig is a 66 y.o. [facility] due to generalized weakness, patient had multiple GI issues, had ulcer disease bleeding, had generalized weakness, admitted and have CT scan abdomen and chest found right lung mass, supposed to get a biopsy but ordered bone scan, result of the bone scan noted for no bone lesion. CT imaging of the head showed some lytic lesion across the head concerning for metastatic disease.,  Patient is in end-stage renal disease on dialysis. She has history also of chronic back pain. She has right chronic shoulder pain however the left shoulder started 2 weeks ago after which she was lifting heavy basket, suffered from severe shoulder pain. It was concerned about metastasis however bone scan came negative. She has shoulder x-ray not showing any abnormalities. I have treated the patient as simple sprain, given some Ultram, also topical analgesics seem to be helping very well and she is feeling good. Patient states  pain is improving across the left shoulder, seem to be overall comfortable.     PAIN  ASSESSMENT:    intermittent    aching    pain is perceived as moderate (4-6 pain scale)      MEDICATIONS:    Current Facility-Administered Medications   Medication Dose Route Frequency Provider Last Rate Last Admin    lidocaine 4 % external patch 3 patch  3 patch Transdermal Daily Araceli Swift MD   3 patch at 02/10/21 0937    metaxalone (SKELAXIN) tablet 400 mg  400 mg Oral TID PRN Araceli Swift MD        lactated ringers infusion   Intravenous Continuous Red Trimble MD   Stopped at 02/05/21 2021    sodium chloride flush 0.9 % injection 10 mL  10 mL Intravenous 2 times per day Shannan Rowe MD   10 mL at 02/09/21 1562  sodium chloride flush 0.9 % injection 10 mL  10 mL Intravenous PRN Kathleen Martin MD   10 mL at 02/08/21 0901    heparin (porcine) injection 5,000 Units  5,000 Units Subcutaneous 3 times per day Kathleen Martin MD   5,000 Units at 02/10/21 0536    promethazine (PHENERGAN) tablet 12.5 mg  12.5 mg Oral Q6H PRN Kathleen Martin MD        Or    [facility] injection 4 mg  4 mg Intravenous Q6H PRN Kathleen Martin MD        polyethylene glycol (GLYCOLAX) packet 17 g  17 g Oral Daily PRN Kathleen Martin MD        acetaminophen (TYLENOL) tablet 650 mg  650 mg Oral Q6H PRN Kathleen Martin MD   650 mg at 02/04/21 0557    heparin (porcine) injection 2,000 Units  2,000 Units Intravenous Once Petey Blackwell,         pantoprazole (PROTONIX) tablet 40 mg  40 mg Oral QAM ILENE LARSON Sedar, DO   40 mg at 02/10/21 0536    traMADol (ULTRAM) tablet 50 mg  50 mg Oral Q6H PRN Nehemias Harrell Sedar, DO   50 mg at 02/10/21 1142         ALLERGIES:  Ace inhibitors, Benazepril, Ciprofloxacin, Shellfish allergy, and Sulfa antibiotics    Review of Systems    Constitutional: Positive for activity change, appetite change, fatigue and unexpected weight change. Negative for chills, diaphoresis and fever. HENT: Negative. Eyes: Negative. Respiratory: Positive for shortness of breath. Negative for apnea, cough, choking, chest tightness, wheezing and stridor. Cardiovascular: Negative. Gastrointestinal: Negative. Endocrine: Negative. Genitourinary: Negative. Musculoskeletal: Positive for back pain, gait problem, myalgias, neck pain and neck stiffness. Negative for arthralgias and joint swelling. Skin: Negative. Allergic/Immunologic: Negative. Neurological: Positive for  Generalized weakness\. Negative for dizziness, tremors, seizures, syncope, facial asymmetry, speech difficulty, light-headedness, numbness and headaches. Psychiatric/Behavioral: Positive for dysphoric mood. Negative for agitation, behavioral problems, confusion, decreased concentration, hallucinations, self-injury, sleep disturbance and suicidal ideas. The patient is not nervous/anxious and is not hyperactive.            OBJECTIVE  PHYSICAL EXAM:  /72   Pulse 92   Temp 98.1 °F (36.7 °C)   Resp 14   Ht 5' 3\" (1.6 m)   Wt 129 lb 13.6 oz (58.9 kg)   SpO2 97%   BMI 23.00 kg/m²   Body mass index is 23 kg/m². CONSTITUTIONAL:  Patient is awake, alert, cooperative, appears to be more comfortable, slightly lethargic coming from dialysis. EYES:  vision intact  ENT:  normocepalic, without obvious abnormality, atraumatic  NECK:  mild tenderness across the paracervical area,  BACK:  Symmetric, no curvature, spinous processes are non-tender on palpation, paraspinous muscles are non-tender on palpation, no costal vertebral tenderness  LUNGS:  no increased work of breathing  CARDIOVASCULAR:  regular rate and rhythm  ABDOMEN: Soft nontender nondistended  MUSCULOSKELETAL: Mild pain across the left shoulder however she is still maintaining good range of motion, no major tenderness. There is no redness, warmth, or swelling of the joints  full range of motion noted  motor strength is 5 out of 5 all extremities bilaterally  tone is normal  NEUROLOGIC: Neurologically intact, motor strength seem to be symmetrical upper lower extremities, no neurological deficit  SKIN:  no bruising or bleeding    DATA: tests reviewed for today's visit:    All labs and imaging results reviewed.      Lab Results   Component Value Date    WBC 16.6 02/10/2021    HGB 10.0 02/10/2021    HCT 33.6 02/10/2021    .1 02/10/2021     02/10/2021     02/10/2021    K 4.1 02/10/2021    CL 98 02/10/2021    CO2 24 02/10/2021    BUN 11 02/10/2021    CREATININE 3.24 02/10/2021    CALCIUM 8.0 02/10/2021    PHOS 2.5 02/10/2021    ALKPHOS 217 02/10/2021    ALT 17 02/10/2021 AST 25 02/10/2021    BILITOT 0.7 02/10/2021    LABALBU 1.6 02/10/2021    LABALBU 2.2 11/16/2020   LABS  Recent Labs     02/08/21  0515 02/09/21  0546 02/10/21  0528   WBC 12.0* 17.2* 16.6*   RBC 3.15* 2.99* 3.17*   HGB 9.9* 9.4* 10.0*   HCT 31.8* 30.2* 33.6*   .0* 101.0* 106.1*   MCH 31.3 31.5* 31.6*   MCHC 31.0* 31.2* 29.8*   RDW 17.5* 17.4* 17.7*    299 270       Recent Labs     02/08/21  0515 02/09/21  0546 02/10/21  0528   * 133* 130*   K 4.5 4.0 4.1   CL 96 97 98   CO2 26 28 24   BUN 10 7* 11   CREATININE 3.62* 2.02* 3.24*   GLUCOSE 101* 71 65*   CALCIUM 8.1* 7.8* 8.0*     NM BONE SCAN WHOLE BODY [[id number]] Collected: 02/09/21 1632      Order Status: Completed Updated: 02/09/21 1636     Narrative:         NM BONE SCAN WHOLE BODY : 2/8/2021     CLINICAL HISTORY:  Possible metastatic disease . COMPARISON: Chest CT 2/5/2021, and abdomen and pelvis CT 2/6/2021. TECHNIQUE: Approximately 5 hours after the intravenous administration of 26.7 mCi of technetium 99m MDP, whole body planar images were obtained. FINDINGS:     There is no abnormal radiotracer accumulation identified to suggest skeletal metastatic disease.          Impression:         NO SCINTIGRAPHIC EVIDENCE OF SKELETAL METASTATIC DISEASE.          CT ABDOMEN PELVIS WO CONTRAST Additional Contrast? None [[id number]] Collected: 02/06/21 1057     Order Status: Completed Updated: 02/06/21 1121     Narrative:       CT of the Abdomen and Pelvis without intravenous contrast medium     History:  Abnormal CT     Technical Factors:     CT imaging of the abdomen and pelvis were obtained and formatted as 5 mm contiguous axial images from the domes of the diaphragm to the symphysis pubis.  Sagittal and coronal reconstructions were also obtained. Oral contrast medium: Water as oral contrast medium. Intravenous contrast medium: None.      Comparison:   Findings: Lungs:  Bilateral pleural effusions and consolidation/atelectasis are seen in both bases. Liver:  Normal in size, shape, and attenuation.       Bile Ducts:  Normal in caliber. Gallbladder: Distended with stones layering in the gallbladder. Pancreas: Homogeneous parenchymal enhancement. No necrosis identified. No intrahepatic fluid collections, cystic or solid lesions, pancreatic ductal dilatation, or calcification. Spleen:  Normal in size without masses or calcifications.  No splenules. Kidneys:  Small with cortical thinning. There is no evidence for hydronephrosis or renal calculus     Adrenals:  Normal.     Small bowel:  Normal in caliber. Appendix:  Normal.     Colon: Diverticulosis coli without evidence for diverticulitis. Peritoneum:  Small to moderate amount of free fluid in the posterior pelvis. Surgical clips are seen in the upper abdomen. Vessels: At the level of the renal veins the abdominal aorta measures 3.5 cm in greatest transverse diameter and 3 cm anterior posterior. Below the renal veins the abdominal aorta measures 5.5 transverse by 5.6 cm AP. Right above the bifurcation the   aneurysm measures 3.9 transverse by 3.8 cm. The aneurysm measures 13 cm craniocaudad. There are diffuse atherosclerotic calcifications visualized. Lymph nodes: No retroperitoneal lymph node enlargement. Bladder: Distended normally       Abdominal Wall: Edematous change is in the subcutaneous fat over the flanks. A tiny umbilical hernia. Bones: No destructive bony lesions are seen.        Impression:         1. Large abdominal aortic aneurysm is seen as described. 2. No evidence for bowel obstruction, diverticulitis or appendicitis. There is evidence of diverticulosis coli. Also free fluid is seen in the posterior pelvis   3. Cholelithiasis   4.  Small to moderate bilateral pleural effusions and bilateral infiltrates versus atelectasis All CT scans at this facility use dose modulation, iterative reconstruction, and/or weight based dosing when appropriate to reduce radiation dose to as low as reasonably achievable.              CT CHEST WO CONTRAST [[id number]] Collected: 02/05/21 1354     Order Status: Completed Updated: 02/05/21 1413     Narrative:       CT of the Chest without intravenous contrast medium. History:  Lung nodule. Follow-up. Nodule identified, in 2018. Also, history of multiple lytic bone lesions in skull and cervical spine. End-stage renal disease. Receiving hemodialysis. Technical Factors:     CT imaging of the chest was obtained and formatted as 5 mm contiguous axial images from the thoracic inlet through the adrenal glands. Sagittal and coronal reconstruction obtained during postprocessing. Intravenous contrast medium:  None     Comparison:  None     Findings: On the right side, a noncalcified nonpleural-based, partially stellate shaped 2.2 x 2.1 x 2.4 cm mass is visualized within the right upper lobe (series 2, image 23, series 601, image 25, series 602, image 21). Strand-like extension from the mass   extending to the pleural surface. Dependent subsegmental atelectatic change versus fluid, dependent portion right middle lobe at level of major fissure. Small to moderate right pleural effusion. Masslike consolidation extending from right infrahilar   region, caudally into the right lower lobe, with calcification identified. 6 mm calcification identified within right lower lobe. On the left side, small to moderate left pleural effusion with consolidation, left lower lobe. No nodules and no masses. Cardiac size normal. No pericardial effusion. Thoracic aorta normal in course and caliber. Multilumen jugular vein central line extending into right ventricle. Limited imaging upper abdomen shows 2 radiopacities measuring up to 2.7 mm just anterior to distal greater curvature (series 2, image 59). No osteoblastic, and no osteolytic lesions.        Impression:         2.2 x 2.1 x 2.4 cm right upper lobe mass. Malignancy diagnosis of exclusion. Bilateral small to moderate pleural effusions with bilateral lower lobe atelectasis/pneumonia. Other findings discussed. All CT scans at this facility use dose modulation, iterative reconstruction, and/or weight based dosing when appropriate to reduce radiation dose to as low as reasonably achievable.      XR HIP LEFT (2-3 VIEWS) [[id number]] Collected: 02/03/21 1208     Order Status: Completed Updated: 02/03/21 1213     Narrative:       EXAMINATION: XR HIP LEFT (2-3 VIEWS), XR SHOULDER LEFT (MIN 2 VIEWS)                        CLINICAL HISTORY:   pain cancer lytic lesions in skull       COMPARISONS: None.       FINDINGS:     Four views of the left shoulder are submitted. There is diffuse generalized osteopenia. No acute fractures. No dislocations. No focal bony abnormalities                                                                                      Impression:       NO ACUTE FRACTURES   CONSIDER WHOLE BODY BONE SCAN TO FURTHER EVALUATE     EXAMINATION: RIGHT HIP       CLINICAL HISTORY: Pain. COMPARISON: None       FINDINGS:     Two views are  submitted. No acute fractures. No dislocations. No focal bony abnormalities                                                                                    IMPRESSION: NO ACUTE FRACTURE. CONSIDER WHOLE BODY BONE SCAN TO FURTHER EVALUATE     XR SHOULDER LEFT (MIN 2 VIEWS) [[id number]] Collected: 02/03/21 1208     Order Status: Completed Updated: 02/03/21 1213     Narrative:       EXAMINATION: XR HIP LEFT (2-3 VIEWS), XR SHOULDER LEFT (MIN 2 VIEWS)                        CLINICAL HISTORY:   pain cancer lytic lesions in skull       COMPARISONS: None.       FINDINGS:     Four views of the left shoulder are submitted. There is diffuse generalized osteopenia. No acute fractures. No dislocations. No focal bony abnormalities                                                                                      Impression:       NO ACUTE FRACTURES   CONSIDER WHOLE BODY BONE SCAN TO FURTHER EVALUATE     EXAMINATION: RIGHT HIP       CLINICAL HISTORY: Pain. COMPARISON: None       FINDINGS:     Two views are  submitted. No acute fractures. No dislocations. No focal bony abnormalities                                                                                    IMPRESSION: NO ACUTE FRACTURE. CONSIDER WHOLE BODY BONE SCAN TO FURTHER EVALUATE           ASSESSMENT & PLAN  Active Hospital Problems    Diagnosis Date Noted    ESRD (end stage renal disease) ([facility] [address] [N18.6] 02/08/2021    Acute pain of left shoulder [M25.512] 02/07/2021    Sprain of left shoulder [S43.402A] 02/07/2021    Neck pain [M54.2] 02/07/2021    Chronic bilateral low back pain without sciatica [M54.5, G89.29] 02/07/2021    Abnormality of gait [R26.9] 02/07/2021    [facility] [N18.6, Z99.2] 02/07/2021    Muscle tenderness [M79.10] 02/07/2021    Lytic lesion of bone on x-ray [M89.9] 02/07/2021    Mass of upper lobe of right lung [R91.8] 02/07/2021    General weakness [R53.1] 02/03/2021      66years old female, initially came for generalized weakness, she has end-stage renal disease on dialysis, initial concern about shortness of breath, CAT scan showed right upper lobe mass, also some suspicious lytic lesion on the bone image of the head, bone scan was performed and noted no signs of metastasis in the bone.     Highly concerned about shoulder pain on admission, no signs of metastasis in the shoulder region, possibly muscle sprain as she was lifting heavy basket 2 weeks ago and possibly cause a sprain, responded well to short course of anti-inflammatory treatment provided and Ultram.      RECOMMENDATION:  SEE ORDERS

## 2021-02-12 ENCOUNTER — OFFICE VISIT (OUTPATIENT)
Dept: GERIATRIC MEDICINE | Age: 71
End: 2021-02-12
Payer: MEDICARE

## 2021-02-12 DIAGNOSIS — R53.1 WEAKNESS: ICD-10-CM

## 2021-02-12 DIAGNOSIS — C79.51 LUNG CANCER METASTATIC TO BONE (HCC): Primary | ICD-10-CM

## 2021-02-12 DIAGNOSIS — J42 CHRONIC BRONCHITIS, UNSPECIFIED CHRONIC BRONCHITIS TYPE (HCC): ICD-10-CM

## 2021-02-12 DIAGNOSIS — C34.90 LUNG CANCER METASTATIC TO BONE (HCC): Primary | ICD-10-CM

## 2021-02-12 DIAGNOSIS — N18.6 ESRD (END STAGE RENAL DISEASE) (HCC): ICD-10-CM

## 2021-02-12 PROCEDURE — 1123F ACP DISCUSS/DSCN MKR DOCD: CPT | Performed by: INTERNAL MEDICINE

## 2021-02-12 PROCEDURE — G8484 FLU IMMUNIZE NO ADMIN: HCPCS | Performed by: INTERNAL MEDICINE

## 2021-02-12 PROCEDURE — 99306 1ST NF CARE HIGH MDM 50: CPT | Performed by: INTERNAL MEDICINE

## 2021-02-16 PROCEDURE — 96372 THER/PROPH/DIAG INJ SC/IM: CPT

## 2021-02-16 RX ORDER — TRAMADOL HYDROCHLORIDE 50 MG/1
50 TABLET ORAL EVERY 6 HOURS PRN
Qty: 45 TABLET | Refills: 0 | Status: SHIPPED | OUTPATIENT
Start: 2021-02-16 | End: 2021-03-18

## 2021-02-25 VITALS — SYSTOLIC BLOOD PRESSURE: 113 MMHG | HEART RATE: 88 BPM | TEMPERATURE: 96.2 F | DIASTOLIC BLOOD PRESSURE: 68 MMHG

## 2021-02-25 NOTE — PROGRESS NOTES
PATIENT: NABOR GAINES : 1950 DOS: 2021     [facility]    HISTORY AND PHYSICAL    CHIEF COMPLAINT:  End-stage renal disease, hypertension, acute allergic reaction, shortness of breath. HISTORY OF PRESENT ILLNESS:  This was a 57-year-old woman with a known history of end-stage renal disease, hypertension. The patient had been recently hospitalized after increasing weakness and fatigue. The patient had been hospitalized after recent decline. The patient has a prior GI bleed. Recently has had angioedema secondary to ACE inhibitors. The patient had been independently functioning, but had been declining. Had missed dialysis days and brought to ER for further evaluation. The patient then did undergo evaluation for acute anemia, but her hemoglobin and hematocrit were stable. Her electrolytes were reviewed as was her renal function. The patient had been tolerating hemodialysis. The patient was evaluated for possibility of lung mass, did undergo CT of the chest.  The patient did have CT of the head performed, which showed calvarial little lytic lesions concerning for metastatic disease. The patient had been seen by pulmonology, did undergo evaluation by Yolette Childers. The patient did undergo evaluation by interventional radiology. The patient was initially evaluated for possibility of CT-guided biopsy. It was ultimately felt the patient would be better served by a bronchoscopy. The patient had been seen by pulmonology and oncology. The patient had been stabilized. The patient had ongoing intermittent pain. The patient was evaluated for possibility of lung cancer with bone mets. The patient had been stabilized. Pain was improved. She was globally weak. The patient did undergo tissue biopsy. The general picture was consistent with possibility of malignancy, although there is no history of malignancy in her family.   The patient herself has had approximately 50-pack-year history of smoking. She quit in 2020. The patient was maintained stably on her hemodialysis. The patient did make gains. The patient's local skin care was performed. She was stabilized and transferred here. PAST MEDICAL HISTORY:  Her past medical history included chronic kidney disease; COPD; tobacco; hypertension; end-stage renal disease, on hemodialysis; hypertensive kidney disease; the patient is status post bronchoscopy the patient with a possible lung cancer with metastatic disease to the skeleton. FAMILY HISTORY:  Negative for early-onset neoplasm. SOCIAL HISTORY:  The patient has a history of approximately 50-pack-year history of tobacco use. REVIEW OF SYSTEMS:  The patient has ongoing intermittent discomfort. No recent chest pain or palpitations. No change in her bowel or bladder habits. .  No bleeding diathesis. Rest of the 14-point review of systems unremarkable. PHYSICAL EXAMINATION:  The patient's vital signs are stable. She is afebrile, 96.2, pulse of 88, blood pressure 113/68. She is frail in appearance. Pupils are small, but they are reactive. Oral mucosa was moist.  Chest showed no crackles, no wheezing. Cardiovascular exam showed a regular rate. Abdomen was soft, nontender. Extremities showed +1 dorsal pedal pulse. Skin showed no evidence of rash. Please refer to nursing notes for detailed skin assessment. Lymph:  No axillary or inguinal lymphadenopathy. ASSESSMENT AND PLAN:  1. Possible lung cancer with metastatic disease. Awaiting results of biopsy. The patient had evaluation by the pulmonology and bronchoscopy. Continue with nutritional support at this time. No evidence of acute hypoxemia or distal embolization. 2.   Skeletal metastasis, but no evidence of any new acute pain crisis. Continue aggressive pain regimen. 3.   COPD. No evidence of acute exacerbation. Continue respiratory treatments as needed. 4.   Generalized debility and weakness.   The patient will undergo a course of physical therapy, occupational therapy, skin surveillance. 5.   End-stage renal disease. Patient remains on hemodialysis and clinically stable. Monitor electrolytes. We will follow as needed. Please note, available hospital records, imaging reports, consultant notes reviewed.         Electronically Signed By: Mary Raya M.D. on 02/16/2021 22:03:44  ______________________________  Mary Raya M.D.  /[id number]  D: 02/14/2021 16:57:47  T: 02/14/2021 18:19:26    cc: Ridge Oscar

## 2021-02-26 ENCOUNTER — OFFICE VISIT (OUTPATIENT)
Dept: GERIATRIC MEDICINE | Age: 71
End: 2021-02-26
Payer: MEDICARE

## 2021-02-26 VITALS
DIASTOLIC BLOOD PRESSURE: 79 MMHG | HEART RATE: 91 BPM | TEMPERATURE: 98.7 F | SYSTOLIC BLOOD PRESSURE: 131 MMHG | OXYGEN SATURATION: 93 % | RESPIRATION RATE: 16 BRPM

## 2021-02-26 DIAGNOSIS — I10 ESSENTIAL HYPERTENSION: ICD-10-CM

## 2021-02-26 DIAGNOSIS — I77.1 ILIAC ARTERY STENOSIS, BILATERAL (HCC): ICD-10-CM

## 2021-02-26 DIAGNOSIS — I71.40 ABDOMINAL AORTIC ANEURYSM (AAA) WITHOUT RUPTURE: ICD-10-CM

## 2021-02-26 DIAGNOSIS — R91.8 MASS OF UPPER LOBE OF RIGHT LUNG: ICD-10-CM

## 2021-02-26 DIAGNOSIS — J12.82 PNEUMONIA DUE TO COVID-19 VIRUS: Primary | ICD-10-CM

## 2021-02-26 DIAGNOSIS — J44.9 CHRONIC OBSTRUCTIVE PULMONARY DISEASE, UNSPECIFIED COPD TYPE (HCC): ICD-10-CM

## 2021-02-26 DIAGNOSIS — D62 ANEMIA DUE TO ACUTE BLOOD LOSS: ICD-10-CM

## 2021-02-26 DIAGNOSIS — U07.1 PNEUMONIA DUE TO COVID-19 VIRUS: Primary | ICD-10-CM

## 2021-02-26 DIAGNOSIS — N18.30 STAGE 3 CHRONIC KIDNEY DISEASE, UNSPECIFIED WHETHER STAGE 3A OR 3B CKD (HCC): ICD-10-CM

## 2021-02-26 DIAGNOSIS — K26.4 GASTROINTESTINAL HEMORRHAGE ASSOCIATED WITH DUODENAL ULCER: ICD-10-CM

## 2021-02-26 PROCEDURE — 99305 1ST NF CARE MODERATE MDM 35: CPT | Performed by: FAMILY MEDICINE

## 2021-02-26 PROCEDURE — 1123F ACP DISCUSS/DSCN MKR DOCD: CPT | Performed by: FAMILY MEDICINE

## 2021-02-26 PROCEDURE — G8484 FLU IMMUNIZE NO ADMIN: HCPCS | Performed by: FAMILY MEDICINE

## 2021-02-26 ASSESSMENT — ENCOUNTER SYMPTOMS
COUGH: 0
SHORTNESS OF BREATH: 0
RHINORRHEA: 0
CONSTIPATION: 0
DIARRHEA: 0
WHEEZING: 0
SORE THROAT: 0
ABDOMINAL PAIN: 0

## 2021-02-26 NOTE — PROGRESS NOTES
resource strain: Not hard at all   Bianca-Saad insecurity     Worry: Never true     Inability: Never true    Transportation needs     Medical: No     Non-medical: No   Tobacco Use    Smoking status: Former Smoker     Quit date: 2020     Years since quittin.5    Smokeless tobacco: Never Used   Substance and Sexual Activity    Alcohol use: Never     Frequency: Never    Drug use: Never    Sexual activity: Not on file   Lifestyle    Physical activity     Days per week: Not on file     Minutes per session: Not on file    Stress: Not on file   Relationships    Social connections     Talks on phone: Not on file     Gets together: Not on file     Attends Oriental orthodox service: Not on file     Active member of club or organization: Not on file     Attends meetings of clubs or organizations: Not on file     Relationship status: Not on file    Intimate partner violence     Fear of current or ex partner: Not on file     Emotionally abused: Not on file     Physically abused: Not on file     Forced sexual activity: Not on file   Other Topics Concern    Not on file   Social History Narrative    Not on file     No family history on file. Allergies   Allergen Reactions    Ace Inhibitors     Benazepril     Ciprofloxacin     Shellfish Allergy     Sulfa Antibiotics      Current Outpatient Medications   Medication Sig Dispense Refill    traMADol (ULTRAM) 50 MG tablet Take 1 tablet by mouth every 6 hours as needed for Pain for up to 30 days.  45 tablet 0    lidocaine 4 % external patch Place 3 patches onto the skin daily (Patient taking differently: Place 3 patches onto the skin daily Neck, back, L shoulder) 90 patch 0    metoprolol succinate (TOPROL XL) 25 MG extended release tablet Take 1 tablet by mouth daily 90 tablet 1    omeprazole (PRILOSEC) 20 MG delayed release capsule Take 1 capsule by mouth daily 90 capsule 1    Multiple Vitamins-Minerals (RENAPLEX-D) TABS TAKE 1 TABLET BY MOUTH EVERY DAY AS DIRECTED No current facility-administered medications for this visit. Vitals:    02/26/21 1122   BP: 131/79   Pulse: 91   Resp: 16   Temp: 98.7 °F (37.1 °C)   SpO2: 93%       Physical exam:  Physical Exam  Vitals signs reviewed. Constitutional:       General: She is not in acute distress. Appearance: She is well-developed. HENT:      Head: Normocephalic and atraumatic. Mouth/Throat:      Pharynx: No oropharyngeal exudate. Neck:      Musculoskeletal: Normal range of motion. Thyroid: No thyromegaly. Cardiovascular:      Rate and Rhythm: Normal rate and regular rhythm. Heart sounds: Normal heart sounds. No murmur. Pulmonary:      Effort: Pulmonary effort is normal. No respiratory distress. Breath sounds: Normal breath sounds. No wheezing. Abdominal:      General: There is no distension. Palpations: Abdomen is soft. Tenderness: There is no abdominal tenderness. There is no guarding or rebound. Lymphadenopathy:      Cervical: No cervical adenopathy. Skin:     General: Skin is warm and dry. Neurological:      Mental Status: She is alert and oriented to person, place, and time. Psychiatric:         Behavior: Behavior normal.         Assessment/Plan:  66 y.o. female here mainly for COVID pneumonia:  - AAA: has f/u in place with Vacular surgery as well as with Dr. Marely Castañeda prior for cardiac clearance  - COVID pneumonia: remains on COVID unit to finish quarantine and finish abx/steroid course; will wean O2 as tolerated  - continuing meds for her other chronic issues     Diagnosis Orders   1. Pneumonia due to COVID-19 virus     2. Gastrointestinal hemorrhage associated with duodenal ulcer     3. Anemia due to acute blood loss     4. Chronic obstructive pulmonary disease, unspecified COPD type ([facility] [address]     5. Stage 3 chronic kidney disease, unspecified whether stage 3a or 3b CKD     6. Essential hypertension     7. Mass of upper lobe of right lung     8.  Abdominal aortic

## 2021-12-06 ENCOUNTER — APPOINTMENT (RX ONLY)
Dept: URBAN - METROPOLITAN AREA CLINIC 164 | Facility: CLINIC | Age: 71
Setting detail: DERMATOLOGY
End: 2021-12-06

## 2021-12-06 DIAGNOSIS — Z87.2 PERSONAL HISTORY OF DISEASES OF THE SKIN AND SUBCUTANEOUS TISSUE: ICD-10-CM

## 2021-12-06 DIAGNOSIS — L81.4 OTHER MELANIN HYPERPIGMENTATION: ICD-10-CM

## 2021-12-06 DIAGNOSIS — D22 MELANOCYTIC NEVI: ICD-10-CM

## 2021-12-06 DIAGNOSIS — D18.0 HEMANGIOMA: ICD-10-CM

## 2021-12-06 DIAGNOSIS — L82.1 OTHER SEBORRHEIC KERATOSIS: ICD-10-CM

## 2021-12-06 DIAGNOSIS — L82.0 INFLAMED SEBORRHEIC KERATOSIS: ICD-10-CM | Status: INADEQUATELY CONTROLLED

## 2021-12-06 DIAGNOSIS — L91.8 OTHER HYPERTROPHIC DISORDERS OF THE SKIN: ICD-10-CM | Status: STABLE

## 2021-12-06 PROBLEM — D22.5 MELANOCYTIC NEVI OF TRUNK: Status: ACTIVE | Noted: 2021-12-06

## 2021-12-06 PROBLEM — D18.01 HEMANGIOMA OF SKIN AND SUBCUTANEOUS TISSUE: Status: ACTIVE | Noted: 2021-12-06

## 2021-12-06 PROCEDURE — ? LIQUID NITROGEN

## 2021-12-06 PROCEDURE — ? SKIN TAG REMOVAL

## 2021-12-06 PROCEDURE — 99213 OFFICE O/P EST LOW 20 MIN: CPT | Mod: 25

## 2021-12-06 PROCEDURE — 17110 DESTRUCTION B9 LES UP TO 14: CPT

## 2021-12-06 PROCEDURE — ? FULL BODY SKIN EXAM

## 2021-12-06 PROCEDURE — 11200 RMVL SKIN TAGS UP TO&INC 15: CPT | Mod: 59

## 2021-12-06 PROCEDURE — ? COUNSELING

## 2021-12-06 ASSESSMENT — LOCATION DETAILED DESCRIPTION DERM
LOCATION DETAILED: LEFT MID-UPPER BACK
LOCATION DETAILED: RIGHT INFERIOR ANTERIOR NECK
LOCATION DETAILED: RIGHT MID-UPPER BACK
LOCATION DETAILED: EPIGASTRIC SKIN
LOCATION DETAILED: LEFT CENTRAL MANDIBULAR CHEEK
LOCATION DETAILED: RIGHT INFERIOR LATERAL NECK
LOCATION DETAILED: LEFT INFERIOR ANTERIOR NECK
LOCATION DETAILED: LEFT INFERIOR LATERAL NECK
LOCATION DETAILED: RIGHT SUPERIOR UPPER BACK
LOCATION DETAILED: RIGHT SUPERIOR MEDIAL MALAR CHEEK

## 2021-12-06 ASSESSMENT — LOCATION ZONE DERM
LOCATION ZONE: FACE
LOCATION ZONE: NECK
LOCATION ZONE: TRUNK

## 2021-12-06 ASSESSMENT — LOCATION SIMPLE DESCRIPTION DERM
LOCATION SIMPLE: RIGHT UPPER BACK
LOCATION SIMPLE: LEFT CHEEK
LOCATION SIMPLE: LEFT ANTERIOR NECK
LOCATION SIMPLE: LEFT UPPER BACK
LOCATION SIMPLE: RIGHT ANTERIOR NECK
LOCATION SIMPLE: RIGHT CHEEK
LOCATION SIMPLE: ABDOMEN

## 2021-12-06 NOTE — PROCEDURE: SKIN TAG REMOVAL
Detail Level: Detailed
Include Z78.9 (Other Specified Conditions Influencing Health Status) As An Associated Diagnosis?: No
Add Associated Diagnoses If Applicable When Selecting Medical Necessity: Yes
Medical Necessity Information: It is in your best interest to select a reason for this procedure from the list below. All of these items fulfill various CMS LCD requirements except the new and changing color options.
Medical Necessity Clause: This procedure was medically necessary because the lesions that were treated were:
Consent: Written consent obtained and the risks of skin tag removal was reviewed with the patient including but not limited to bleeding, pigmentary change, infection, pain, and remote possibility of scarring.
Anesthesia Volume In Cc: 1

## 2021-12-06 NOTE — PROCEDURE: LIQUID NITROGEN
Render Post-Care Instructions In Note?: no
Detail Level: Detailed
Show Topical Anesthesia Variable?: Yes
Medical Necessity Information: It is in your best interest to select a reason for this procedure from the list below. All of these items fulfill various CMS LCD requirements except the new and changing color options.
Medical Necessity Clause: This procedure was medically necessary because the lesions that were treated were:
Consent: The patient's consent was obtained including but not limited to risks of crusting, scabbing, blistering, scarring, darker or lighter pigmentary change, recurrence, incomplete removal and infection.
Post-Care Instructions: I reviewed with the patient in detail post-care instructions. Patient is to wear sunprotection, and avoid picking at any of the treated lesions. Pt may apply Vaseline to crusted or scabbing areas.

## 2022-12-05 ENCOUNTER — APPOINTMENT (RX ONLY)
Dept: URBAN - METROPOLITAN AREA CLINIC 164 | Facility: CLINIC | Age: 72
Setting detail: DERMATOLOGY
End: 2022-12-05

## 2022-12-05 DIAGNOSIS — L81.4 OTHER MELANIN HYPERPIGMENTATION: ICD-10-CM

## 2022-12-05 DIAGNOSIS — Z12.83 ENCOUNTER FOR SCREENING FOR MALIGNANT NEOPLASM OF SKIN: ICD-10-CM

## 2022-12-05 DIAGNOSIS — D22 MELANOCYTIC NEVI: ICD-10-CM

## 2022-12-05 DIAGNOSIS — L82.1 OTHER SEBORRHEIC KERATOSIS: ICD-10-CM

## 2022-12-05 DIAGNOSIS — D18.0 HEMANGIOMA: ICD-10-CM

## 2022-12-05 PROBLEM — D22.5 MELANOCYTIC NEVI OF TRUNK: Status: ACTIVE | Noted: 2022-12-05

## 2022-12-05 PROBLEM — D18.01 HEMANGIOMA OF SKIN AND SUBCUTANEOUS TISSUE: Status: ACTIVE | Noted: 2022-12-05

## 2022-12-05 PROCEDURE — ? COUNSELING

## 2022-12-05 PROCEDURE — 99213 OFFICE O/P EST LOW 20 MIN: CPT

## 2022-12-05 PROCEDURE — ? FULL BODY SKIN EXAM

## 2022-12-05 ASSESSMENT — LOCATION SIMPLE DESCRIPTION DERM
LOCATION SIMPLE: ABDOMEN
LOCATION SIMPLE: RIGHT UPPER BACK
LOCATION SIMPLE: LEFT UPPER BACK
LOCATION SIMPLE: CHEST

## 2022-12-05 ASSESSMENT — LOCATION DETAILED DESCRIPTION DERM
LOCATION DETAILED: LEFT MID-UPPER BACK
LOCATION DETAILED: RIGHT SUPERIOR UPPER BACK
LOCATION DETAILED: PERIUMBILICAL SKIN
LOCATION DETAILED: LEFT LATERAL SUPERIOR CHEST
LOCATION DETAILED: RIGHT MID-UPPER BACK

## 2022-12-05 ASSESSMENT — LOCATION ZONE DERM: LOCATION ZONE: TRUNK

## 2023-11-09 ENCOUNTER — APPOINTMENT (RX ONLY)
Dept: URBAN - METROPOLITAN AREA CLINIC 164 | Facility: CLINIC | Age: 73
Setting detail: DERMATOLOGY
End: 2023-11-09

## 2023-11-09 DIAGNOSIS — L82.0 INFLAMED SEBORRHEIC KERATOSIS: ICD-10-CM

## 2023-11-09 DIAGNOSIS — L30.4 ERYTHEMA INTERTRIGO: ICD-10-CM | Status: INADEQUATELY CONTROLLED

## 2023-11-09 PROCEDURE — ? DEFER

## 2023-11-09 PROCEDURE — 99214 OFFICE O/P EST MOD 30 MIN: CPT

## 2023-11-09 PROCEDURE — ? PRESCRIPTION MEDICATION MANAGEMENT

## 2023-11-09 PROCEDURE — ? COUNSELING

## 2023-11-09 PROCEDURE — ? PRESCRIPTION

## 2023-11-09 RX ORDER — TRIAMCINOLONE ACETONIDE 1 MG/G
CREAM TOPICAL BID
Qty: 80 | Refills: 1 | Status: ERX | COMMUNITY
Start: 2023-11-09

## 2023-11-09 RX ORDER — KETOCONAZOLE 20 MG/G
1 CREAM TOPICAL BID
Qty: 60 | Refills: 1 | Status: ERX | COMMUNITY
Start: 2023-11-09

## 2023-11-09 RX ADMIN — KETOCONAZOLE 1: 20 CREAM TOPICAL at 00:00

## 2023-11-09 RX ADMIN — TRIAMCINOLONE ACETONIDE: 1 CREAM TOPICAL at 00:00

## 2023-11-09 ASSESSMENT — LOCATION DETAILED DESCRIPTION DERM
LOCATION DETAILED: RIGHT LATERAL BREAST 6-7:00 REGION
LOCATION DETAILED: LEFT SUPERIOR MEDIAL UPPER BACK
LOCATION DETAILED: RIGHT MEDIAL BREAST 4-5:00 REGION
LOCATION DETAILED: LEFT INFRAMAMMARY CREASE (INNER QUADRANT)
LOCATION DETAILED: LEFT SUPERIOR UPPER BACK

## 2023-11-09 ASSESSMENT — LOCATION SIMPLE DESCRIPTION DERM
LOCATION SIMPLE: LEFT UPPER BACK
LOCATION SIMPLE: LEFT BREAST
LOCATION SIMPLE: RIGHT BREAST

## 2023-11-09 ASSESSMENT — LOCATION ZONE DERM: LOCATION ZONE: TRUNK

## 2023-11-09 NOTE — PROCEDURE: DEFER
X Size Of Lesion In Cm (Optional): 0
Detail Level: Zone
Procedure To Be Performed At Next Visit: Liquid nitrogen
Introduction Text (Please End With A Colon): The following procedure was deferred:

## 2023-11-09 NOTE — PROCEDURE: PRESCRIPTION MEDICATION MANAGEMENT
Initiate Treatment: .\\n\\n-Ketoconazole 2 % topical cream BID
Detail Level: Zone
Render In Strict Bullet Format?: No
Plan: RTC in 1 month for a follow up evaluation post topical antifungal treatment.

## 2023-12-18 ENCOUNTER — APPOINTMENT (RX ONLY)
Dept: URBAN - METROPOLITAN AREA CLINIC 164 | Facility: CLINIC | Age: 73
Setting detail: DERMATOLOGY
End: 2023-12-18

## 2023-12-18 DIAGNOSIS — L30.4 ERYTHEMA INTERTRIGO: ICD-10-CM | Status: RESOLVING

## 2023-12-18 PROCEDURE — 99214 OFFICE O/P EST MOD 30 MIN: CPT

## 2023-12-18 PROCEDURE — ? COUNSELING

## 2023-12-18 PROCEDURE — ? PRESCRIPTION MEDICATION MANAGEMENT

## 2023-12-18 NOTE — PROCEDURE: PRESCRIPTION MEDICATION MANAGEMENT
Detail Level: Zone
Render In Strict Bullet Format?: No
Discontinue Regimen: ketoconazole 2 % topical cream TP\\nSig: Apply to affected areas of rash under breast line BID x 2-4weeks then discontinue. Repeat PRN for flares.\\nQuantity: 60 Gram Refills: 1 Earliest fill date: November 09, 2023\\ntriamcinolone acetonide 0.1 % topical cream TP\\nSig: Apply to rash under breast bid x 2-4 weeks\\nQuantity: 80 Gram Refills: 1

## 2024-03-19 ENCOUNTER — APPOINTMENT (RX ONLY)
Dept: URBAN - METROPOLITAN AREA CLINIC 164 | Facility: CLINIC | Age: 74
Setting detail: DERMATOLOGY
End: 2024-03-19

## 2024-03-19 DIAGNOSIS — L82.1 OTHER SEBORRHEIC KERATOSIS: ICD-10-CM

## 2024-03-19 DIAGNOSIS — Z12.83 ENCOUNTER FOR SCREENING FOR MALIGNANT NEOPLASM OF SKIN: ICD-10-CM

## 2024-03-19 DIAGNOSIS — L81.4 OTHER MELANIN HYPERPIGMENTATION: ICD-10-CM

## 2024-03-19 DIAGNOSIS — D18.0 HEMANGIOMA: ICD-10-CM

## 2024-03-19 DIAGNOSIS — D22 MELANOCYTIC NEVI: ICD-10-CM

## 2024-03-19 PROBLEM — D18.01 HEMANGIOMA OF SKIN AND SUBCUTANEOUS TISSUE: Status: ACTIVE | Noted: 2024-03-19

## 2024-03-19 PROBLEM — D22.5 MELANOCYTIC NEVI OF TRUNK: Status: ACTIVE | Noted: 2024-03-19

## 2024-03-19 PROCEDURE — ? ADDITIONAL NOTES

## 2024-03-19 PROCEDURE — 99213 OFFICE O/P EST LOW 20 MIN: CPT

## 2024-03-19 PROCEDURE — ? COUNSELING

## 2024-03-19 PROCEDURE — ? FULL BODY SKIN EXAM

## 2024-03-19 ASSESSMENT — LOCATION ZONE DERM: LOCATION ZONE: TRUNK

## 2024-03-19 ASSESSMENT — LOCATION DETAILED DESCRIPTION DERM
LOCATION DETAILED: EPIGASTRIC SKIN
LOCATION DETAILED: PERIUMBILICAL SKIN
LOCATION DETAILED: RIGHT SUPERIOR UPPER BACK
LOCATION DETAILED: RIGHT MID-UPPER BACK
LOCATION DETAILED: LEFT MID-UPPER BACK

## 2024-03-19 ASSESSMENT — LOCATION SIMPLE DESCRIPTION DERM
LOCATION SIMPLE: LEFT UPPER BACK
LOCATION SIMPLE: ABDOMEN
LOCATION SIMPLE: RIGHT UPPER BACK

## 2025-02-27 ENCOUNTER — APPOINTMENT (OUTPATIENT)
Dept: URBAN - METROPOLITAN AREA CLINIC 164 | Facility: CLINIC | Age: 75
Setting detail: DERMATOLOGY
End: 2025-02-27

## 2025-02-27 DIAGNOSIS — D22 MELANOCYTIC NEVI: ICD-10-CM

## 2025-02-27 DIAGNOSIS — Z12.83 ENCOUNTER FOR SCREENING FOR MALIGNANT NEOPLASM OF SKIN: ICD-10-CM

## 2025-02-27 DIAGNOSIS — L82.1 OTHER SEBORRHEIC KERATOSIS: ICD-10-CM

## 2025-02-27 DIAGNOSIS — D18.0 HEMANGIOMA: ICD-10-CM

## 2025-02-27 DIAGNOSIS — L81.4 OTHER MELANIN HYPERPIGMENTATION: ICD-10-CM

## 2025-02-27 DIAGNOSIS — L24 IRRITANT CONTACT DERMATITIS: ICD-10-CM | Status: INADEQUATELY CONTROLLED

## 2025-02-27 PROBLEM — D18.01 HEMANGIOMA OF SKIN AND SUBCUTANEOUS TISSUE: Status: ACTIVE | Noted: 2025-02-27

## 2025-02-27 PROBLEM — L24.9 IRRITANT CONTACT DERMATITIS, UNSPECIFIED CAUSE: Status: ACTIVE | Noted: 2025-02-27

## 2025-02-27 PROBLEM — D22.5 MELANOCYTIC NEVI OF TRUNK: Status: ACTIVE | Noted: 2025-02-27

## 2025-02-27 PROCEDURE — 99213 OFFICE O/P EST LOW 20 MIN: CPT

## 2025-02-27 PROCEDURE — ? COUNSELING

## 2025-02-27 PROCEDURE — ? ADDITIONAL NOTES

## 2025-02-27 PROCEDURE — ? PRESCRIPTION MEDICATION MANAGEMENT

## 2025-02-27 PROCEDURE — ? PRESCRIPTION

## 2025-02-27 PROCEDURE — ? FULL BODY SKIN EXAM

## 2025-02-27 RX ORDER — MOMETASONE FUROATE 1 MG/ML
SOLUTION TOPICAL
Qty: 60 | Refills: 4 | Status: ERX | COMMUNITY
Start: 2025-02-27

## 2025-02-27 RX ADMIN — MOMETASONE FUROATE: 1 SOLUTION TOPICAL at 00:00

## 2025-02-27 ASSESSMENT — LOCATION SIMPLE DESCRIPTION DERM
LOCATION SIMPLE: RIGHT UPPER BACK
LOCATION SIMPLE: LEFT UPPER BACK
LOCATION SIMPLE: ABDOMEN
LOCATION SIMPLE: RIGHT PLANTAR SURFACE

## 2025-02-27 ASSESSMENT — LOCATION DETAILED DESCRIPTION DERM
LOCATION DETAILED: RIGHT MID-UPPER BACK
LOCATION DETAILED: EPIGASTRIC SKIN
LOCATION DETAILED: RIGHT MEDIAL PLANTAR MIDFOOT
LOCATION DETAILED: PERIUMBILICAL SKIN
LOCATION DETAILED: RIGHT SUPERIOR UPPER BACK
LOCATION DETAILED: LEFT MID-UPPER BACK

## 2025-02-27 ASSESSMENT — LOCATION ZONE DERM
LOCATION ZONE: FEET
LOCATION ZONE: TRUNK

## 2025-02-27 NOTE — HPI: EVALUATION OF SKIN LESION(S)
What Type Of Note Output Would You Prefer (Optional)?: Bullet Format
Hpi Title: Evaluation of Skin Lesions
How Severe Are Your Spot(S)?: mild
Have Your Spot(S) Been Treated In The Past?: has not been treated
Family Member: Father
Additional History: Spot of concern on abdomen

## 2025-02-27 NOTE — PROCEDURE: PRESCRIPTION MEDICATION MANAGEMENT
Render In Strict Bullet Format?: No
Detail Level: Zone
Initiate Treatment: mometasone 0.1 % topical solution \\nQuantity: 60.0 ml  Days Supply: 30\\nSig: Apply BID to affected areas up to 2 weeks